# Patient Record
Sex: FEMALE | Race: OTHER | NOT HISPANIC OR LATINO | URBAN - METROPOLITAN AREA
[De-identification: names, ages, dates, MRNs, and addresses within clinical notes are randomized per-mention and may not be internally consistent; named-entity substitution may affect disease eponyms.]

---

## 2017-10-31 ENCOUNTER — INPATIENT (INPATIENT)
Facility: HOSPITAL | Age: 82
LOS: 7 days | Discharge: HOME | DRG: 291 | End: 2017-11-08
Attending: HOSPITALIST | Admitting: HOSPITALIST
Payer: MEDICARE

## 2017-10-31 VITALS
OXYGEN SATURATION: 100 % | RESPIRATION RATE: 21 BRPM | HEART RATE: 56 BPM | DIASTOLIC BLOOD PRESSURE: 90 MMHG | SYSTOLIC BLOOD PRESSURE: 188 MMHG

## 2017-10-31 DIAGNOSIS — Z90.710 ACQUIRED ABSENCE OF BOTH CERVIX AND UTERUS: Chronic | ICD-10-CM

## 2017-10-31 DIAGNOSIS — Z98.890 OTHER SPECIFIED POSTPROCEDURAL STATES: Chronic | ICD-10-CM

## 2017-10-31 DIAGNOSIS — R06.00 DYSPNEA, UNSPECIFIED: ICD-10-CM

## 2017-10-31 LAB
ALBUMIN SERPL ELPH-MCNC: 3.9 G/DL — SIGNIFICANT CHANGE UP (ref 3.3–5)
ALP SERPL-CCNC: 80 U/L — SIGNIFICANT CHANGE UP (ref 40–120)
ALT FLD-CCNC: 20 U/L RC — SIGNIFICANT CHANGE UP (ref 10–45)
ANION GAP SERPL CALC-SCNC: 14 MMOL/L — SIGNIFICANT CHANGE UP (ref 5–17)
APPEARANCE UR: CLEAR — SIGNIFICANT CHANGE UP
APTT BLD: 41.8 SEC — HIGH (ref 27.5–37.4)
AST SERPL-CCNC: 31 U/L — SIGNIFICANT CHANGE UP (ref 10–40)
BASOPHILS # BLD AUTO: 0 K/UL — SIGNIFICANT CHANGE UP (ref 0–0.2)
BASOPHILS NFR BLD AUTO: 0.5 % — SIGNIFICANT CHANGE UP (ref 0–2)
BILIRUB SERPL-MCNC: 0.2 MG/DL — SIGNIFICANT CHANGE UP (ref 0.2–1.2)
BILIRUB UR-MCNC: NEGATIVE — SIGNIFICANT CHANGE UP
BUN SERPL-MCNC: 36 MG/DL — HIGH (ref 7–23)
CALCIUM SERPL-MCNC: 9 MG/DL — SIGNIFICANT CHANGE UP (ref 8.4–10.5)
CHLORIDE SERPL-SCNC: 98 MMOL/L — SIGNIFICANT CHANGE UP (ref 96–108)
CK MB BLD-MCNC: 5.4 % — HIGH (ref 0–3.5)
CK MB CFR SERPL CALC: 5.8 NG/ML — HIGH (ref 0–3.8)
CK SERPL-CCNC: 107 U/L — SIGNIFICANT CHANGE UP (ref 25–170)
CO2 SERPL-SCNC: 19 MMOL/L — LOW (ref 22–31)
COLOR SPEC: YELLOW — SIGNIFICANT CHANGE UP
CREAT SERPL-MCNC: 2.5 MG/DL — HIGH (ref 0.5–1.3)
DIFF PNL FLD: ABNORMAL
EOSINOPHIL # BLD AUTO: 0.2 K/UL — SIGNIFICANT CHANGE UP (ref 0–0.5)
EOSINOPHIL NFR BLD AUTO: 2 % — SIGNIFICANT CHANGE UP (ref 0–6)
EPI CELLS # UR: SIGNIFICANT CHANGE UP /HPF
GAS PNL BLDV: SIGNIFICANT CHANGE UP
GLUCOSE SERPL-MCNC: 121 MG/DL — HIGH (ref 70–99)
GLUCOSE UR QL: NEGATIVE — SIGNIFICANT CHANGE UP
HCT VFR BLD CALC: 35.1 % — SIGNIFICANT CHANGE UP (ref 34.5–45)
HGB BLD-MCNC: 11.5 G/DL — SIGNIFICANT CHANGE UP (ref 11.5–15.5)
INR BLD: 2.45 RATIO — HIGH (ref 0.88–1.16)
KETONES UR-MCNC: NEGATIVE — SIGNIFICANT CHANGE UP
LEUKOCYTE ESTERASE UR-ACNC: NEGATIVE — SIGNIFICANT CHANGE UP
LIDOCAIN IGE QN: 38 U/L — SIGNIFICANT CHANGE UP (ref 7–60)
LYMPHOCYTES # BLD AUTO: 0.9 K/UL — LOW (ref 1–3.3)
LYMPHOCYTES # BLD AUTO: 9.1 % — LOW (ref 13–44)
MCHC RBC-ENTMCNC: 31 PG — SIGNIFICANT CHANGE UP (ref 27–34)
MCHC RBC-ENTMCNC: 32.8 GM/DL — SIGNIFICANT CHANGE UP (ref 32–36)
MCV RBC AUTO: 94.5 FL — SIGNIFICANT CHANGE UP (ref 80–100)
MONOCYTES # BLD AUTO: 0.3 K/UL — SIGNIFICANT CHANGE UP (ref 0–0.9)
MONOCYTES NFR BLD AUTO: 2.9 % — SIGNIFICANT CHANGE UP (ref 2–14)
NEUTROPHILS # BLD AUTO: 8.3 K/UL — HIGH (ref 1.8–7.4)
NEUTROPHILS NFR BLD AUTO: 85.5 % — HIGH (ref 43–77)
NITRITE UR-MCNC: NEGATIVE — SIGNIFICANT CHANGE UP
PH UR: 6 — SIGNIFICANT CHANGE UP (ref 5–8)
PLATELET # BLD AUTO: 311 K/UL — SIGNIFICANT CHANGE UP (ref 150–400)
POTASSIUM SERPL-MCNC: 6.3 MMOL/L — CRITICAL HIGH (ref 3.5–5.3)
POTASSIUM SERPL-SCNC: 6.3 MMOL/L — CRITICAL HIGH (ref 3.5–5.3)
PROT SERPL-MCNC: 8.1 G/DL — SIGNIFICANT CHANGE UP (ref 6–8.3)
PROT UR-MCNC: 150 MG/DL
PROTHROM AB SERPL-ACNC: 27 SEC — HIGH (ref 9.8–12.7)
RAPID RVP RESULT: SIGNIFICANT CHANGE UP
RBC # BLD: 3.72 M/UL — LOW (ref 3.8–5.2)
RBC # FLD: 12.2 % — SIGNIFICANT CHANGE UP (ref 10.3–14.5)
RBC CASTS # UR COMP ASSIST: ABNORMAL /HPF (ref 0–2)
SODIUM SERPL-SCNC: 131 MMOL/L — LOW (ref 135–145)
SP GR SPEC: 1.01 — SIGNIFICANT CHANGE UP (ref 1.01–1.02)
TROPONIN T SERPL-MCNC: 0.12 NG/ML — HIGH (ref 0–0.06)
UROBILINOGEN FLD QL: NEGATIVE — SIGNIFICANT CHANGE UP
WBC # BLD: 9.7 K/UL — SIGNIFICANT CHANGE UP (ref 3.8–10.5)
WBC # FLD AUTO: 9.7 K/UL — SIGNIFICANT CHANGE UP (ref 3.8–10.5)
WBC UR QL: SIGNIFICANT CHANGE UP /HPF (ref 0–5)

## 2017-10-31 PROCEDURE — 71010: CPT | Mod: 26

## 2017-10-31 PROCEDURE — 93010 ELECTROCARDIOGRAM REPORT: CPT

## 2017-10-31 PROCEDURE — 99285 EMERGENCY DEPT VISIT HI MDM: CPT | Mod: 25,GC

## 2017-10-31 RX ORDER — INSULIN HUMAN 100 [IU]/ML
10 INJECTION, SOLUTION SUBCUTANEOUS ONCE
Qty: 0 | Refills: 0 | Status: COMPLETED | OUTPATIENT
Start: 2017-10-31 | End: 2017-10-31

## 2017-10-31 RX ORDER — CALCIUM GLUCONATE 100 MG/ML
2 VIAL (ML) INTRAVENOUS ONCE
Qty: 0 | Refills: 0 | Status: COMPLETED | OUTPATIENT
Start: 2017-10-31 | End: 2017-10-31

## 2017-10-31 RX ORDER — ALBUTEROL 90 UG/1
7.5 AEROSOL, METERED ORAL ONCE
Qty: 0 | Refills: 0 | Status: COMPLETED | OUTPATIENT
Start: 2017-10-31 | End: 2017-10-31

## 2017-10-31 RX ORDER — SODIUM BICARBONATE 1 MEQ/ML
50 SYRINGE (ML) INTRAVENOUS ONCE
Qty: 0 | Refills: 0 | Status: COMPLETED | OUTPATIENT
Start: 2017-10-31 | End: 2017-10-31

## 2017-10-31 RX ORDER — NITROGLYCERIN 6.5 MG
0.4 CAPSULE, EXTENDED RELEASE ORAL ONCE
Qty: 0 | Refills: 0 | Status: COMPLETED | OUTPATIENT
Start: 2017-10-31 | End: 2017-10-31

## 2017-10-31 RX ORDER — IPRATROPIUM/ALBUTEROL SULFATE 18-103MCG
3 AEROSOL WITH ADAPTER (GRAM) INHALATION ONCE
Qty: 0 | Refills: 0 | Status: COMPLETED | OUTPATIENT
Start: 2017-10-31 | End: 2017-10-31

## 2017-10-31 RX ORDER — FUROSEMIDE 40 MG
80 TABLET ORAL ONCE
Qty: 0 | Refills: 0 | Status: COMPLETED | OUTPATIENT
Start: 2017-10-31 | End: 2017-10-31

## 2017-10-31 RX ORDER — DEXTROSE 50 % IN WATER 50 %
50 SYRINGE (ML) INTRAVENOUS ONCE
Qty: 0 | Refills: 0 | Status: COMPLETED | OUTPATIENT
Start: 2017-10-31 | End: 2017-10-31

## 2017-10-31 RX ORDER — SODIUM POLYSTYRENE SULFONATE 4.1 MEQ/G
30 POWDER, FOR SUSPENSION ORAL ONCE
Qty: 0 | Refills: 0 | Status: DISCONTINUED | OUTPATIENT
Start: 2017-10-31 | End: 2017-10-31

## 2017-10-31 RX ADMIN — Medication 400 GRAM(S): at 23:06

## 2017-10-31 RX ADMIN — Medication 50 MILLIEQUIVALENT(S): at 21:57

## 2017-10-31 RX ADMIN — Medication 0.4 MILLIGRAM(S): at 22:58

## 2017-10-31 RX ADMIN — INSULIN HUMAN 10 UNIT(S): 100 INJECTION, SOLUTION SUBCUTANEOUS at 21:55

## 2017-10-31 RX ADMIN — Medication 3 MILLILITER(S): at 19:49

## 2017-10-31 RX ADMIN — Medication 80 MILLIGRAM(S): at 23:10

## 2017-10-31 RX ADMIN — Medication 50 MILLILITER(S): at 21:56

## 2017-10-31 RX ADMIN — Medication 125 MILLIGRAM(S): at 21:04

## 2017-10-31 RX ADMIN — ALBUTEROL 7.5 MILLIGRAM(S): 90 AEROSOL, METERED ORAL at 22:28

## 2017-10-31 NOTE — ED PROVIDER NOTE - OBJECTIVE STATEMENT
85F pmh asthma, DM, HTN, afib found yesterday, hypothyroid p/w shortness of breath and nausea/vomiting.  Pt has had URI symptoms and shortness of breath for past few days, but today patient felt nauseas and vomited.  EMS brought patient in and gave her zofran en route.  Pt denies chest pain, abd pain, diarreha, fever/chills.   - Kimberli Matthew, DO 85F pmh asthma, DM, HTN, afib found yesterday, hypothyroid p/w shortness of breath and nausea/vomiting.  Pt has had URI symptoms and shortness of breath for past few days, but today patient felt nauseas and vomited.  EMS brought patient in and gave her zofran en route.  Pt denies chest pain, abd pain, diarreha, fever/chills.   PMD: Dr. Abhishek Matthew DO

## 2017-10-31 NOTE — ED ADULT NURSE REASSESSMENT NOTE - GENERAL PATIENT STATE
comfortable appearance/cooperative/family/SO at bedside
family/SO at bedside/comfortable appearance/cooperative

## 2017-10-31 NOTE — ED ADULT NURSE NOTE - OBJECTIVE STATEMENT
pt BIBA from home with c/o "SOB and cough that started yesterday but today it did not get any better and I became more nauseous and started vomiting. Yesterday I was dx with afib and was started on blood thinners." pt denies fevers/chills, chest pain, or difficulty breathing at present. pt had x1 episode of vomiting while on the ambulance and received 4mg of zofran PTA. pt speaking in full complete sentences at present.

## 2017-10-31 NOTE — ED ADULT TRIAGE NOTE - CHIEF COMPLAINT QUOTE
pt BIBA with c/o sob since yesterday and n/v today pt BIBA with c/o sob since yesterday and n/v today. found to be in new onset afib at PMDs office yesterday as per daughter

## 2017-10-31 NOTE — ED PROVIDER NOTE - ATTENDING CONTRIBUTION TO CARE
85F pmh asthma, DM, HTN, afib found yesterday, hypothyroid p/w shortness of breath and nausea/vomiting.  No CP. ECG NSR.  Possible asthma exacerbation vs cardiac etiology.  Will obtain ekg, cxr, labs, cultures, rvp, give duonebs and plan to admit.

## 2017-10-31 NOTE — ED ADULT NURSE NOTE - PMH
Diabetes    HTN (hypertension)    Hypothyroid Asthma    Diabetes    HTN (hypertension)    Hypothyroid

## 2017-10-31 NOTE — ED PROVIDER NOTE - PHYSICAL EXAMINATION
Gen: AOx3, in moderate respiratory distress  Head: NCAT  HEENT: PERRL, oral mucosa moist, normal conjunctiva  Lung: Wheezing bilaterally and decreased breath sounds bilateral bases.   CV: rrr, no murmurs, Normal perfusion  Abd: soft, NTND, no CVA tenderness  MSK: No edema, no visible deformities  Neuro: No focal neurologic deficits  Skin: No rash   Psych: normal affect   - Kimberli Matthew, DO

## 2017-10-31 NOTE — ED PROVIDER NOTE - MEDICAL DECISION MAKING DETAILS
85F pmh [place], new onset afib p/w nausea, shortness of breath.  Wheezing and poor air entry on exam.  Possible asthma exacerbation vs cardiac etiology.  Will obtain ekg, cxr, labs, cultures, rvp, dive duonebs and plan to admit. - Kimberli Matthew DO 85F pmh [place], new onset afib p/w nausea, shortness of breath.  Wheezing and poor air entry on exam.  Possible asthma exacerbation vs cardiac etiology.  Will obtain ekg, cxr, labs, cultures, rvp, give duonebs and plan to admit. - Kimberli Matthew DO

## 2017-11-01 ENCOUNTER — OUTPATIENT (OUTPATIENT)
Dept: OUTPATIENT SERVICES | Facility: HOSPITAL | Age: 82
LOS: 1 days | End: 2017-11-01
Payer: MEDICARE

## 2017-11-01 DIAGNOSIS — J45.909 UNSPECIFIED ASTHMA, UNCOMPLICATED: ICD-10-CM

## 2017-11-01 DIAGNOSIS — Z98.890 OTHER SPECIFIED POSTPROCEDURAL STATES: Chronic | ICD-10-CM

## 2017-11-01 DIAGNOSIS — J96.01 ACUTE RESPIRATORY FAILURE WITH HYPOXIA: ICD-10-CM

## 2017-11-01 DIAGNOSIS — I50.21 ACUTE SYSTOLIC (CONGESTIVE) HEART FAILURE: ICD-10-CM

## 2017-11-01 DIAGNOSIS — I48.91 UNSPECIFIED ATRIAL FIBRILLATION: ICD-10-CM

## 2017-11-01 DIAGNOSIS — E11.9 TYPE 2 DIABETES MELLITUS WITHOUT COMPLICATIONS: ICD-10-CM

## 2017-11-01 DIAGNOSIS — E87.70 FLUID OVERLOAD, UNSPECIFIED: ICD-10-CM

## 2017-11-01 DIAGNOSIS — Z29.9 ENCOUNTER FOR PROPHYLACTIC MEASURES, UNSPECIFIED: ICD-10-CM

## 2017-11-01 DIAGNOSIS — R06.00 DYSPNEA, UNSPECIFIED: ICD-10-CM

## 2017-11-01 DIAGNOSIS — I16.1 HYPERTENSIVE EMERGENCY: ICD-10-CM

## 2017-11-01 DIAGNOSIS — E87.5 HYPERKALEMIA: ICD-10-CM

## 2017-11-01 DIAGNOSIS — N17.9 ACUTE KIDNEY FAILURE, UNSPECIFIED: ICD-10-CM

## 2017-11-01 DIAGNOSIS — Z90.710 ACQUIRED ABSENCE OF BOTH CERVIX AND UTERUS: Chronic | ICD-10-CM

## 2017-11-01 DIAGNOSIS — E03.9 HYPOTHYROIDISM, UNSPECIFIED: ICD-10-CM

## 2017-11-01 LAB
BASE EXCESS BLDV CALC-SCNC: -2.4 MMOL/L — LOW (ref -2–2)
BASE EXCESS BLDV CALC-SCNC: -3 MMOL/L — LOW (ref -2–2)
CA-I SERPL-SCNC: 1.18 MMOL/L — SIGNIFICANT CHANGE UP (ref 1.12–1.3)
CA-I SERPL-SCNC: 1.3 MMOL/L — SIGNIFICANT CHANGE UP (ref 1.12–1.3)
CHLORIDE BLDV-SCNC: 95 MMOL/L — LOW (ref 96–108)
CHLORIDE BLDV-SCNC: 98 MMOL/L — SIGNIFICANT CHANGE UP (ref 96–108)
CO2 BLDV-SCNC: 24 MMOL/L — SIGNIFICANT CHANGE UP (ref 22–30)
CO2 BLDV-SCNC: 25 MMOL/L — SIGNIFICANT CHANGE UP (ref 22–30)
CREAT ?TM UR-MCNC: 15 MG/DL — SIGNIFICANT CHANGE UP
CREAT ?TM UR-MCNC: 16 MG/DL — SIGNIFICANT CHANGE UP
CULTURE RESULTS: NO GROWTH — SIGNIFICANT CHANGE UP
GAS PNL BLDV: 126 MMOL/L — LOW (ref 136–145)
GAS PNL BLDV: 129 MMOL/L — LOW (ref 136–145)
GAS PNL BLDV: SIGNIFICANT CHANGE UP
GLUCOSE BLDC GLUCOMTR-MCNC: 241 MG/DL — HIGH (ref 70–99)
GLUCOSE BLDC GLUCOMTR-MCNC: 263 MG/DL — HIGH (ref 70–99)
GLUCOSE BLDC GLUCOMTR-MCNC: 273 MG/DL — HIGH (ref 70–99)
GLUCOSE BLDV-MCNC: 241 MG/DL — HIGH (ref 70–99)
GLUCOSE BLDV-MCNC: 249 MG/DL — HIGH (ref 70–99)
HCO3 BLDV-SCNC: 23 MMOL/L — SIGNIFICANT CHANGE UP (ref 21–29)
HCO3 BLDV-SCNC: 23 MMOL/L — SIGNIFICANT CHANGE UP (ref 21–29)
HCT VFR BLDA CALC: 34 % — LOW (ref 39–50)
HCT VFR BLDA CALC: 35 % — LOW (ref 39–50)
HGB BLD CALC-MCNC: 11.1 G/DL — LOW (ref 11.5–15.5)
HGB BLD CALC-MCNC: 11.5 G/DL — SIGNIFICANT CHANGE UP (ref 11.5–15.5)
LACTATE BLDV-MCNC: 1.2 MMOL/L — SIGNIFICANT CHANGE UP (ref 0.7–2)
LACTATE BLDV-MCNC: 3.4 MMOL/L — HIGH (ref 0.7–2)
OSMOLALITY SERPL: 292 MOS/KG — SIGNIFICANT CHANGE UP (ref 275–300)
OSMOLALITY UR: 285 MOS/KG — LOW (ref 300–900)
OTHER CELLS CSF MANUAL: 10 ML/DL — LOW (ref 18–22)
PCO2 BLDV: 47 MMHG — SIGNIFICANT CHANGE UP (ref 35–50)
PCO2 BLDV: 48 MMHG — SIGNIFICANT CHANGE UP (ref 35–50)
PH BLDV: 7.31 — LOW (ref 7.35–7.45)
PH BLDV: 7.31 — LOW (ref 7.35–7.45)
PO2 BLDV: 37 MMHG — SIGNIFICANT CHANGE UP (ref 25–45)
PO2 BLDV: 40 MMHG — SIGNIFICANT CHANGE UP (ref 25–45)
POTASSIUM BLDV-SCNC: 5 MMOL/L — SIGNIFICANT CHANGE UP (ref 3.5–5)
POTASSIUM BLDV-SCNC: 5.1 MMOL/L — HIGH (ref 3.5–5)
PROT ?TM UR-MCNC: 67 MG/DL — HIGH (ref 0–12)
PROT/CREAT UR-RTO: 4.2 RATIO — HIGH (ref 0–0.2)
SAO2 % BLDV: 67 % — SIGNIFICANT CHANGE UP (ref 67–88)
SAO2 % BLDV: 68 % — SIGNIFICANT CHANGE UP (ref 67–88)
SODIUM UR-SCNC: 86 MMOL/L — SIGNIFICANT CHANGE UP
SPECIMEN SOURCE: SIGNIFICANT CHANGE UP
TROPONIN T SERPL-MCNC: 0.1 NG/ML — HIGH (ref 0–0.06)

## 2017-11-01 PROCEDURE — 99223 1ST HOSP IP/OBS HIGH 75: CPT | Mod: AI

## 2017-11-01 PROCEDURE — 93308 TTE F-UP OR LMTD: CPT | Mod: 26

## 2017-11-01 PROCEDURE — 12345: CPT | Mod: GC,NC

## 2017-11-01 PROCEDURE — 99223 1ST HOSP IP/OBS HIGH 75: CPT | Mod: GC

## 2017-11-01 PROCEDURE — G9001: CPT

## 2017-11-01 PROCEDURE — 99233 SBSQ HOSP IP/OBS HIGH 50: CPT

## 2017-11-01 RX ORDER — DEXTROSE 50 % IN WATER 50 %
25 SYRINGE (ML) INTRAVENOUS ONCE
Qty: 0 | Refills: 0 | Status: DISCONTINUED | OUTPATIENT
Start: 2017-11-01 | End: 2017-11-01

## 2017-11-01 RX ORDER — ALBUTEROL 90 UG/1
108 AEROSOL, METERED ORAL
Qty: 0 | Refills: 0 | COMMUNITY

## 2017-11-01 RX ORDER — DEXTROSE 50 % IN WATER 50 %
12.5 SYRINGE (ML) INTRAVENOUS ONCE
Qty: 0 | Refills: 0 | Status: DISCONTINUED | OUTPATIENT
Start: 2017-11-01 | End: 2017-11-01

## 2017-11-01 RX ORDER — INSULIN LISPRO 100/ML
VIAL (ML) SUBCUTANEOUS
Qty: 0 | Refills: 0 | Status: DISCONTINUED | OUTPATIENT
Start: 2017-11-01 | End: 2017-11-08

## 2017-11-01 RX ORDER — HEPARIN SODIUM 5000 [USP'U]/ML
3000 INJECTION INTRAVENOUS; SUBCUTANEOUS EVERY 6 HOURS
Qty: 0 | Refills: 0 | Status: DISCONTINUED | OUTPATIENT
Start: 2017-11-01 | End: 2017-11-07

## 2017-11-01 RX ORDER — GLUCAGON INJECTION, SOLUTION 0.5 MG/.1ML
1 INJECTION, SOLUTION SUBCUTANEOUS ONCE
Qty: 0 | Refills: 0 | Status: DISCONTINUED | OUTPATIENT
Start: 2017-11-01 | End: 2017-11-08

## 2017-11-01 RX ORDER — FUROSEMIDE 40 MG
60 TABLET ORAL
Qty: 0 | Refills: 0 | Status: DISCONTINUED | OUTPATIENT
Start: 2017-11-01 | End: 2017-11-01

## 2017-11-01 RX ORDER — SODIUM CHLORIDE 9 MG/ML
1000 INJECTION, SOLUTION INTRAVENOUS
Qty: 0 | Refills: 0 | Status: DISCONTINUED | OUTPATIENT
Start: 2017-11-01 | End: 2017-11-08

## 2017-11-01 RX ORDER — BUDESONIDE AND FORMOTEROL FUMARATE DIHYDRATE 160; 4.5 UG/1; UG/1
2 AEROSOL RESPIRATORY (INHALATION)
Qty: 0 | Refills: 0 | Status: DISCONTINUED | OUTPATIENT
Start: 2017-11-01 | End: 2017-11-01

## 2017-11-01 RX ORDER — ACETAMINOPHEN 500 MG
650 TABLET ORAL EVERY 6 HOURS
Qty: 0 | Refills: 0 | Status: DISCONTINUED | OUTPATIENT
Start: 2017-11-01 | End: 2017-11-08

## 2017-11-01 RX ORDER — AMLODIPINE BESYLATE 2.5 MG/1
1 TABLET ORAL
Qty: 0 | Refills: 0 | COMMUNITY

## 2017-11-01 RX ORDER — AMLODIPINE BESYLATE 2.5 MG/1
10 TABLET ORAL DAILY
Qty: 0 | Refills: 0 | Status: DISCONTINUED | OUTPATIENT
Start: 2017-11-01 | End: 2017-11-01

## 2017-11-01 RX ORDER — FUROSEMIDE 40 MG
40 TABLET ORAL
Qty: 0 | Refills: 0 | Status: DISCONTINUED | OUTPATIENT
Start: 2017-11-01 | End: 2017-11-01

## 2017-11-01 RX ORDER — INSULIN LISPRO 100/ML
VIAL (ML) SUBCUTANEOUS AT BEDTIME
Qty: 0 | Refills: 0 | Status: DISCONTINUED | OUTPATIENT
Start: 2017-11-01 | End: 2017-11-01

## 2017-11-01 RX ORDER — BUDESONIDE, MICRONIZED 100 %
0.5 POWDER (GRAM) MISCELLANEOUS
Qty: 0 | Refills: 0 | Status: DISCONTINUED | OUTPATIENT
Start: 2017-11-01 | End: 2017-11-08

## 2017-11-01 RX ORDER — GLUCAGON INJECTION, SOLUTION 0.5 MG/.1ML
1 INJECTION, SOLUTION SUBCUTANEOUS ONCE
Qty: 0 | Refills: 0 | Status: DISCONTINUED | OUTPATIENT
Start: 2017-11-01 | End: 2017-11-01

## 2017-11-01 RX ORDER — HEPARIN SODIUM 5000 [USP'U]/ML
6000 INJECTION INTRAVENOUS; SUBCUTANEOUS EVERY 6 HOURS
Qty: 0 | Refills: 0 | Status: DISCONTINUED | OUTPATIENT
Start: 2017-11-01 | End: 2017-11-07

## 2017-11-01 RX ORDER — METOPROLOL TARTRATE 50 MG
50 TABLET ORAL
Qty: 0 | Refills: 0 | Status: DISCONTINUED | OUTPATIENT
Start: 2017-11-01 | End: 2017-11-01

## 2017-11-01 RX ORDER — AMLODIPINE BESYLATE 2.5 MG/1
10 TABLET ORAL DAILY
Qty: 0 | Refills: 0 | Status: DISCONTINUED | OUTPATIENT
Start: 2017-11-01 | End: 2017-11-08

## 2017-11-01 RX ORDER — IPRATROPIUM/ALBUTEROL SULFATE 18-103MCG
3 AEROSOL WITH ADAPTER (GRAM) INHALATION EVERY 6 HOURS
Qty: 0 | Refills: 0 | Status: DISCONTINUED | OUTPATIENT
Start: 2017-11-01 | End: 2017-11-02

## 2017-11-01 RX ORDER — LEVOTHYROXINE SODIUM 125 MCG
1 TABLET ORAL
Qty: 0 | Refills: 0 | COMMUNITY

## 2017-11-01 RX ORDER — GLIMEPIRIDE 1 MG
1 TABLET ORAL
Qty: 0 | Refills: 0 | COMMUNITY

## 2017-11-01 RX ORDER — LEVOTHYROXINE SODIUM 125 MCG
75 TABLET ORAL DAILY
Qty: 0 | Refills: 0 | Status: DISCONTINUED | OUTPATIENT
Start: 2017-11-01 | End: 2017-11-08

## 2017-11-01 RX ORDER — SODIUM CHLORIDE 9 MG/ML
1000 INJECTION, SOLUTION INTRAVENOUS
Qty: 0 | Refills: 0 | Status: DISCONTINUED | OUTPATIENT
Start: 2017-11-01 | End: 2017-11-01

## 2017-11-01 RX ORDER — HYDRALAZINE HCL 50 MG
5 TABLET ORAL ONCE
Qty: 0 | Refills: 0 | Status: COMPLETED | OUTPATIENT
Start: 2017-11-01 | End: 2017-11-01

## 2017-11-01 RX ORDER — INSULIN LISPRO 100/ML
VIAL (ML) SUBCUTANEOUS
Qty: 0 | Refills: 0 | Status: DISCONTINUED | OUTPATIENT
Start: 2017-11-01 | End: 2017-11-01

## 2017-11-01 RX ORDER — FLUTICASONE PROPIONATE AND SALMETEROL 50; 250 UG/1; UG/1
1 POWDER ORAL; RESPIRATORY (INHALATION)
Qty: 0 | Refills: 0 | COMMUNITY

## 2017-11-01 RX ORDER — HEPARIN SODIUM 5000 [USP'U]/ML
INJECTION INTRAVENOUS; SUBCUTANEOUS
Qty: 25000 | Refills: 0 | Status: DISCONTINUED | OUTPATIENT
Start: 2017-11-01 | End: 2017-11-07

## 2017-11-01 RX ORDER — DEXTROSE 50 % IN WATER 50 %
1 SYRINGE (ML) INTRAVENOUS ONCE
Qty: 0 | Refills: 0 | Status: DISCONTINUED | OUTPATIENT
Start: 2017-11-01 | End: 2017-11-01

## 2017-11-01 RX ORDER — INSULIN GLARGINE 100 [IU]/ML
7 INJECTION, SOLUTION SUBCUTANEOUS AT BEDTIME
Qty: 0 | Refills: 0 | Status: DISCONTINUED | OUTPATIENT
Start: 2017-11-01 | End: 2017-11-08

## 2017-11-01 RX ORDER — RIVAROXABAN 15 MG-20MG
15 KIT ORAL EVERY 24 HOURS
Qty: 0 | Refills: 0 | Status: DISCONTINUED | OUTPATIENT
Start: 2017-11-01 | End: 2017-11-01

## 2017-11-01 RX ORDER — LISINOPRIL 2.5 MG/1
1 TABLET ORAL
Qty: 0 | Refills: 0 | COMMUNITY

## 2017-11-01 RX ORDER — DEXTROSE 50 % IN WATER 50 %
25 SYRINGE (ML) INTRAVENOUS ONCE
Qty: 0 | Refills: 0 | Status: DISCONTINUED | OUTPATIENT
Start: 2017-11-01 | End: 2017-11-08

## 2017-11-01 RX ORDER — DEXTROSE 50 % IN WATER 50 %
12.5 SYRINGE (ML) INTRAVENOUS ONCE
Qty: 0 | Refills: 0 | Status: DISCONTINUED | OUTPATIENT
Start: 2017-11-01 | End: 2017-11-08

## 2017-11-01 RX ORDER — INSULIN LISPRO 100/ML
VIAL (ML) SUBCUTANEOUS EVERY 6 HOURS
Qty: 0 | Refills: 0 | Status: DISCONTINUED | OUTPATIENT
Start: 2017-11-01 | End: 2017-11-01

## 2017-11-01 RX ORDER — DEXTROSE 50 % IN WATER 50 %
1 SYRINGE (ML) INTRAVENOUS ONCE
Qty: 0 | Refills: 0 | Status: DISCONTINUED | OUTPATIENT
Start: 2017-11-01 | End: 2017-11-08

## 2017-11-01 RX ORDER — AMIODARONE HYDROCHLORIDE 400 MG/1
400 TABLET ORAL
Qty: 0 | Refills: 0 | Status: DISCONTINUED | OUTPATIENT
Start: 2017-11-01 | End: 2017-11-01

## 2017-11-01 RX ORDER — HYDRALAZINE HCL 50 MG
10 TABLET ORAL ONCE
Qty: 0 | Refills: 0 | Status: COMPLETED | OUTPATIENT
Start: 2017-11-01 | End: 2017-11-01

## 2017-11-01 RX ORDER — INSULIN LISPRO 100/ML
VIAL (ML) SUBCUTANEOUS AT BEDTIME
Qty: 0 | Refills: 0 | Status: DISCONTINUED | OUTPATIENT
Start: 2017-11-01 | End: 2017-11-08

## 2017-11-01 RX ADMIN — Medication 1: at 18:09

## 2017-11-01 RX ADMIN — Medication 30 MILLIGRAM(S): at 22:05

## 2017-11-01 RX ADMIN — Medication 650 MILLIGRAM(S): at 16:43

## 2017-11-01 RX ADMIN — AMIODARONE HYDROCHLORIDE 400 MILLIGRAM(S): 400 TABLET ORAL at 12:52

## 2017-11-01 RX ADMIN — Medication 650 MILLIGRAM(S): at 17:13

## 2017-11-01 RX ADMIN — Medication 10 MILLIGRAM(S): at 04:22

## 2017-11-01 RX ADMIN — Medication 60 MILLIGRAM(S): at 05:30

## 2017-11-01 RX ADMIN — Medication 5 MILLIGRAM(S): at 03:27

## 2017-11-01 RX ADMIN — Medication 0.5 MILLIGRAM(S): at 18:06

## 2017-11-01 RX ADMIN — Medication: at 10:44

## 2017-11-01 RX ADMIN — AMLODIPINE BESYLATE 10 MILLIGRAM(S): 2.5 TABLET ORAL at 07:34

## 2017-11-01 RX ADMIN — Medication 3 MILLILITER(S): at 13:11

## 2017-11-01 RX ADMIN — Medication 1: at 22:06

## 2017-11-01 RX ADMIN — BUDESONIDE AND FORMOTEROL FUMARATE DIHYDRATE 2 PUFF(S): 160; 4.5 AEROSOL RESPIRATORY (INHALATION) at 05:58

## 2017-11-01 RX ADMIN — Medication 30 MILLIGRAM(S): at 13:11

## 2017-11-01 RX ADMIN — Medication 50 MILLIGRAM(S): at 12:53

## 2017-11-01 RX ADMIN — Medication 75 MICROGRAM(S): at 05:30

## 2017-11-01 RX ADMIN — HEPARIN SODIUM 1300 UNIT(S)/HR: 5000 INJECTION INTRAVENOUS; SUBCUTANEOUS at 19:01

## 2017-11-01 RX ADMIN — Medication 3 MILLILITER(S): at 05:30

## 2017-11-01 RX ADMIN — INSULIN GLARGINE 7 UNIT(S): 100 INJECTION, SOLUTION SUBCUTANEOUS at 22:06

## 2017-11-01 RX ADMIN — Medication 2: at 14:06

## 2017-11-01 RX ADMIN — Medication 3 MILLILITER(S): at 18:13

## 2017-11-01 NOTE — CONSULT NOTE ADULT - ASSESSMENT
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    -- check TTE  -- ischemia evaluation  -- continue tele  -- please obtain PMD/Cardiologist records documenting AFib  -- hold amiodarone  -- can consider discontinuing heparin gtt until AFib is documented    Hunter Blandon MD

## 2017-11-01 NOTE — CONSULT NOTE ADULT - ATTENDING COMMENTS
seen and examined with fellow. I agree with H & P, A & P.  Hold amio and Xarelto.  Obtain ecg with "AF".  Check echo.
84 yo [place] female with Dm, HTN, likely underlying CKD now with SAM, hyperkalemia and decompensated CHF and cardiorenal syndrome  SAM: trend Cr and renal sono  Pt reportedly had history of ureteral stent in [place]  Hyperkalemia improved with diuresis  Continue lasix IV  CHF: echo and evaluate LV dysfunction and valvular disease  Cardio followup
Please page [phone] with questions or call the office [phone].

## 2017-11-01 NOTE — H&P ADULT - PROBLEM SELECTOR PLAN 1
- Patient placed on bipap 10/5, 35% with 100% SaO2 and improving VBG (ph 7.19->7.31).  - Will continue to monitor breathing status. Titrate off bipap as tolerated.  - S/p diuresis 80mg IV lasix. Further diuresis as needed in the context of possible heart failure. - Patient placed on bipap 10/5, 35% with 100% SaO2 and improving VBG (ph 7.19->7.31). LIkely etiology is flash pulmonary edema in setting of hypertensive emergency  - Will continue to monitor breathing status. Titrate off bipap as tolerated.  - S/p diuresis 80mg IV lasix. Pt had brisk UOP ~2L afterwards, so will c/w reduced 40 IV bid for now  -TTE

## 2017-11-01 NOTE — H&P ADULT - NSHPLABSRESULTS_GEN_ALL_CORE
CXR: clear lung.    (10-31 @ 20:34)                      11.5  9.7 )-----------( 311                 35.1    Neutrophils = 8.3 (85.5%)  Lymphocytes = 0.9 (9.1%)  Eosinophils = 0.2 (2.0%)  Basophils = 0.0 (0.5%)  Monocytes = 0.3 (2.9%)  Bands = --%        132<L>  |  96  |  36<H>  ----------------------------<  259<H>  5.2   |  22  |  2.47<H>    Ca    9.8      2017 02:21    TPro  8.1  /  Alb  3.9  /  TBili  0.2  /  DBili  x   /  AST  31  /  ALT  20  /  AlkPhos  80  10-31    ( 31 Oct 2017 20:34 )   PT: 27.0 sec;   INR: 2.45 ratio;       PTT:41.8 sec  CARDIAC MARKERS ( 31 Oct 2017 20:34 )  Trop 0.12 ng/mL<H> /  U/L / CKMB x           RVP:(10-31 @ 20:33)  [facility]      Venous Blood Gas:   @ 02:21  7.31/47/37/23/67  VBG Lactate: 1.2  Venous Blood Gas:  10-31 @ 20:34  7.19/57/30/21/40  VBG Lactate: 1.8        Tox:         Urinalysis Basic - ( 31 Oct 2017 21:14 )    Color: Yellow / Appearance: Clear / S.013 / pH: x  Gluc: x / Ketone: Negative  / Bili: Negative / Urobili: Negative   Blood: x / Protein: 150 mg/dL / Nitrite: Negative   Leuk Esterase: Negative / RBC: 10-25 /HPF / WBC 3-5 /HPF   Sq Epi: x / Non Sq Epi: Few /HPF / Bacteria: x  CXR personally reviewed : clear lungs was the prelim read, though there does appear to be vasc congestion.    Labs personally reviewed     (10-31 @ 20:34)                      11.5  9.7 )-----------( 311                 35.1    Neutrophils = 8.3 (85.5%)  Lymphocytes = 0.9 (9.1%)  Eosinophils = 0.2 (2.0%)  Basophils = 0.0 (0.5%)  Monocytes = 0.3 (2.9%)  Bands = --%        132<L>  |  96  |  36<H>  ----------------------------<  259<H>  5.2   |  22  |  2.47<H>    Ca    9.8      2017 02:21    TPro  8.1  /  Alb  3.9  /  TBili  0.2  /  DBili  x   /  AST  31  /  ALT  20  /  AlkPhos  80  10-31    ( 31 Oct 2017 20:34 )   PT: 27.0 sec;   INR: 2.45 ratio;       PTT:41.8 sec  CARDIAC MARKERS ( 31 Oct 2017 20:34 )  Trop 0.12 ng/mL<H> /  U/L / CKMB x           RVP:(10-31 @ 20:33)  [facility]      Venous Blood Gas:   @ 02:21  7.31/47/37/23/67  VBG Lactate: 1.2  Venous Blood Gas:  10-31 @ 20:34  7.19/57/30/21/40  VBG Lactate: 1.8        Tox:         Urinalysis Basic - ( 31 Oct 2017 21:14 )    Color: Yellow / Appearance: Clear / S.013 / pH: x  Gluc: x / Ketone: Negative  / Bili: Negative / Urobili: Negative   Blood: x / Protein: 150 mg/dL / Nitrite: Negative   Leuk Esterase: Negative / RBC: 10-25 /HPF / WBC 3-5 /HPF   Sq Epi: x / Non Sq Epi: Few /HPF / Bacteria: x    EKG personally reviewed trifascicular block, rate in 50s.

## 2017-11-01 NOTE — CONSULT NOTE ADULT - PROBLEM SELECTOR RECOMMENDATION 9
Urinalysis with active sediment, pending workup   -f/u including protein / creatinine ratio  -f/u glomerulonephritis workup.  No history of hemoptysis, and unlikely that dyspnea is due to pulmorenal syndrome.    -Would check renal sonogram to assess kidney size and assess for obstruction  -maintain martin catheter  -monitor creatinine trend  -poor cardiac function noted on Echo, would consider cardiac workup - renal insufficiency possibly due to cardiorenal syndrome

## 2017-11-01 NOTE — H&P ADULT - PROBLEM SELECTOR PLAN 6
- New diagnosis of afib recently. However, only sinus armand with 1st HB on admission.  - Hold home dosage of loppressor given bradycardia for now.  - Continue to monitor on telemetry. - New diagnosis of afib recently. However, only sinus armand with 1st HB on admission.  - Hold home dosage of loppressor given bradycardia for now.  - Continue to monitor on telemetry.  - C/w renally dosing xarelto. - New diagnosis of afib recently reported by family, unsure where or when this was made. However, pt with sinus armand on monitor with evidence of trifascicular block on 12 Lead   Hold home dosage of loppressor and amio given bradycardia for now.  - Continue to monitor on telemetry; clarify history; for now continue with xarelto as if true AF her CHADSVASC would be >2  -Cards eval for trifasc block with rate in 50s; will keep pacer pads at bedside  -no ischemic chest pain or hx of syncope per grandson at bedside; further history to be clarified in am when son returns with meds and possibly charts from [place]  -Cards/EP eval called for eval of trifasc block and possible nonemergent PPM  - C/w renally dosing xarelto.

## 2017-11-01 NOTE — H&P ADULT - ATTENDING COMMENTS
Pt seen and examined at bedside.  I have precepted this case with house staff and agree with resident note above and edited where appropriate.

## 2017-11-01 NOTE — PROGRESS NOTE ADULT - ASSESSMENT
84yo F w/ PMHx of DM, HTN, hypothyroidism, asthma, newly diagnosed afib BIBEMS yesterday for sob and n/v at home, found to have hyperK in the context of SAM, likely flash pulm edema in setting of hypertensive emergency, and resp distress. 84yo F w/ PMHx of DM, HTN, hypothyroidism, asthma, newly diagnosed afib 2 days ago BIBEMS yesterday for sob and n/v at home, found to have a potassium of 6.2 in the context of SAM, likely flash pulm edema in setting of hypertensive emergency vs asthma excacerbation, and hypoxic and hypercapnic respiratory distress

## 2017-11-01 NOTE — H&P ADULT - PROBLEM SELECTOR PLAN 5
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  - Diuresis as needed. Strict I&Os.  - Likely contributing to resp distress.  - Repeat formal echo in the am.

## 2017-11-01 NOTE — PROGRESS NOTE ADULT - PROBLEM SELECTOR PLAN 7
- Resp status stable on bipap. Continue to trend VBG.  - S/p solumedrol 125mg IV push. Continue with advair and Proair at outpatient dosage. Does not appear to be acute exacerbation at this time; no wheezing on exam, no evidence of hypoxia or hypercapnia, and resp distress improved with bipap/BP control. - s/p solumedrol 125mg IV in the ED  - c/w solumedrol 30g q8h  - will start Pulmicort 0.5mg BID s/p 1x solumedrol 125mg IV in the ED  - c/w solumedrol 30g q8h  - will start Pulmicort 0.5mg BID

## 2017-11-01 NOTE — H&P ADULT - PROBLEM SELECTOR PLAN 2
- , MAP>100 at the time of examination. Patient likely did not tolerate po bp meds at home yesterday due to repeated vomiting.  - Hydralazine IV push as needed for now. Continue to monitor BP.  - Home dosage amlodipine restarted. Lisinopril on hold due to hyperK status. - , MAP>100 at the time of examination. Patient likely did not tolerate po bp meds at home yesterday due to repeated vomiting. Of note, patient family reported history of renal artery stenosis requiring stenting.  - Hydralazine IV push as needed for now. Continue to monitor BP.  - Home dosage amlodipine restarted. Lisinopril on hold due to hyperK status.  - Will discuss with nephrology regarding resistant HTN treatment in the am. - , MAP>100 at the time of examination with evidence of respiratory distress/flash edema. Patient likely did not tolerate po bp meds at home yesterday due to repeated vomiting. Of note, patient family reported history of renal artery stenosis requiring stenting.  - Hydralazine IV push as needed for now. Continue to monitor BP; goal pressure in first 24 hours is SBP of ~170s; most recent 174.   - Home dosage amlodipine restarted. Lisinopril on hold due to hyperK status and ?ARIANNA.  If persistently >180 in am will consider PO hydralazine.   - Will discuss with nephrology regarding resistant HTN treatment in the am, for now pt is at goal - , MAP>100 at the time of examination with evidence of respiratory distress/flash edema. Patient likely did not tolerate po bp meds at home yesterday due to repeated vomiting. Of note, patient family reported history of renal artery stenosis requiring stenting.  - Hydralazine IV push as needed for now. Continue to monitor BP; goal pressure in first 24 hours is SBP of ~170s; most recent 174.   - Home dosage amlodipine restarted. Lisinopril on hold due to hyperK status and ?ARIANNA.  If persistently >180 in am will consider PO hydralazine.   - Will defer to am team discuss with nephrology regarding resistant HTN treatment in the am, for now pt is at goal and would not want to drop more than 25% in first day. - , MAP>100 at the time of examination with evidence of respiratory distress/flash edema. Patient likely did not tolerate po bp meds at home yesterday due to repeated vomiting. Of note, patient family reported history of renal artery stenosis requiring stenting.  - s/p hydralazine 10, 5. Continue to monitor BP; goal pressure in first 24 hours is SBP of ~170s; most recent 174.   - Home dosage amlodipine restarted. Lisinopril on hold due to hyperK status and ?ARIANNA.  If persistently >180 in am will consider PO hydralazine.   - Will defer to am team discuss with nephrology regarding resistant HTN treatment in the am, for now pt is at goal and would not want to drop more than 25% in first day.

## 2017-11-01 NOTE — ED PROCEDURE NOTE - PROCEDURE ADDITIONAL DETAILS
POCUS: Emergency Department Focused Ultrasound performed at patient's bedside.  The complete report will be available in PACS.   Poor LV systolic function  No pericardial effusion

## 2017-11-01 NOTE — PROGRESS NOTE ADULT - PROBLEM SELECTOR PLAN 6
- New diagnosis of afib recently reported by family, unsure where or when this was made. However, pt with sinus armand on monitor with evidence of trifascicular block on 12 Lead   Hold home dosage of loppressor and amio given bradycardia for now.  - Continue to monitor on telemetry; clarify history; for now continue with xarelto as if true AF her CHADSVASC would be >2  -Cards eval for trifasc block with rate in 50s; will keep pacer pads at bedside  -no ischemic chest pain or hx of syncope per grandson at bedside; further history to be clarified in am when son returns with meds and possibly charts from [place]  -Cards/EP eval called for eval of trifasc block and possible nonemergent PPM  - C/w renally dosing xarelto. - Patient with newly diagnosed afib at outpatient cardiologist office and started on amiodarone, xarelto, and metoprolol    - will hold xarelto in the setting of SAM  - Cards/EP eval called for eval of trifasc block on admission with sinus bradycardia (1st degree AV block) and possible nonemergent PPM - Patient with newly diagnosed afib at outpatient cardiologist office and started on amiodarone, xarelto, and metoprolol    - will hold xarelto in the setting of SAM  - Cards/EP eval called for eval of trifasc block on admission with sinus bradycardia (1st degree AV block) and possible nonemergent PPM  holding home metoprolol and amiodarone as per EP recommendations - Patient with newly diagnosed afib at outpatient cardiologist office and started on amiodarone, xarelto, and metoprolol    - will hold xarelto in the setting of SAM, will start heparin gtt  - cards/EP eval called for eval of trifasc block on admission with sinus bradycardia (1st degree AV block) and possible nonemergent PPM  - holding home metoprolol and amiodarone as per EP recommendations

## 2017-11-01 NOTE — H&P ADULT - ASSESSMENT
86yo F w/ PMHx of DM, HTN, hypothyroidism, asthma, newly diagnosed afib BIBEMS yesterday for sob and n/v at home, found to have hyperK in the context of SAM, likely HF exacerbation, and resp eistress. 84yo F w/ PMHx of DM, HTN, hypothyroidism, asthma, newly diagnosed afib BIBEMS yesterday for sob and n/v at home, found to have hyperK in the context of SAM, likely flash pulm edema in setting of hypertensive emergency, and resp distress.

## 2017-11-01 NOTE — H&P ADULT - PROBLEM SELECTOR PLAN 4
- SAM with Cr. 2.5 on admission with associated hyperK and tryptonemia; likely secondary to acute HF and volume overloading/third spacing.  - Nephrology on consult and sent labs for GN w/u.  - Continue to diuresis as needed; strict I&Os. Monitor renal functions.

## 2017-11-01 NOTE — CONSULT NOTE ADULT - SUBJECTIVE AND OBJECTIVE BOX
Chief Complaint:     HPI:    PMH:   Asthma  Diabetes  Hypothyroid  HTN (hypertension)    PSH:   History of renal stent  H/O abdominal hysterectomy    Family History:  FAMILY HISTORY:    Allergies:  Iron 100 (Rash)    Social History:  Smoking:  Alcohol:  Drugs:    Medications:  acetaminophen   Tablet. 650 milliGRAM(s) Oral every 6 hours PRN  ALBUTerol/ipratropium for Nebulization 3 milliLiter(s) Nebulizer every 6 hours  amLODIPine   Tablet 10 milliGRAM(s) Oral daily  buDESOnide   0.5 milliGRAM(s) Respule 0.5 milliGRAM(s) Inhalation two times a day  dextrose 5%. 1000 milliLiter(s) IV Continuous <Continuous>  dextrose 50% Injectable 12.5 Gram(s) IV Push once  dextrose 50% Injectable 25 Gram(s) IV Push once  dextrose 50% Injectable 25 Gram(s) IV Push once  dextrose Gel 1 Dose(s) Oral once PRN  glucagon  Injectable 1 milliGRAM(s) IntraMuscular once PRN  heparin  Infusion.  Unit(s)/Hr IV Continuous <Continuous>  heparin  Injectable 6000 Unit(s) IV Push every 6 hours PRN  heparin  Injectable 3000 Unit(s) IV Push every 6 hours PRN  insulin glargine Injectable (LANTUS) 7 Unit(s) SubCutaneous at bedtime  insulin lispro (HumaLOG) corrective regimen sliding scale   SubCutaneous three times a day before meals  insulin lispro (HumaLOG) corrective regimen sliding scale   SubCutaneous at bedtime  levothyroxine 75 MICROGram(s) Oral daily  methylPREDNISolone sodium succinate Injectable 30 milliGRAM(s) IV Push every 8 hours      Cardiovascular Diagnostic Testing:  ECG:    Echo:    Stress Testing:    Cath:    Imaging:    Labs:                        11.5   9.7   )-----------( 311      ( 31 Oct 2017 20:34 )             35.1         132<L>  |  96  |  36<H>  ----------------------------<  259<H>  5.2   |  22  |  2.47<H>    Ca    9.8      2017 02:21  Phos  4.1       Mg     1.6         TPro  8.1  /  Alb  3.9  /  TBili  0.2  /  DBili  x   /  AST  31  /  ALT  20  /  AlkPhos  80  10-31    PT/INR - ( 31 Oct 2017 20:34 )   PT: 27.0 sec;   INR: 2.45 ratio         PTT - ( 31 Oct 2017 20:34 )  PTT:41.8 sec  CARDIAC MARKERS ( 2017 02:21 )  x     / 0.10 ng/mL / x     / x     / x      CARDIAC MARKERS ( 31 Oct 2017 20:34 )  x     / 0.12 ng/mL / 107 U/L / x     / 5.8 ng/mL      Serum Pro-Brain Natriuretic Peptide: 33391 pg/mL (10-31 @ 20:34)        Thyroid Stimulating Hormone, Serum: 4.36 uIU/mL ( @ 04:31)      Physical Exam:  T(C): 36.4 (17 @ 14:40), Max: 37.2 (10-31-17 @ 20:35)  HR: 57 (17 @ 14:40) (53 - 64)  BP: 141/70 (17 @ 14:40) (140/62 - 223/81)  RR: 20 (17 14:40) (16 - 24)  SpO2: 100% (17 @ 14:40) (98% - 100%)  Wt(kg): --    10-31 @ :  -   @ 07:00  --------------------------------------------------------  IN: 0 mL / OUT: 2000 mL / NET: -2000 mL     @ 07:  -   @ 18:20  --------------------------------------------------------  IN: 100 mL / OUT: 1600 mL / NET: -1500 mL      Daily Height in cm: 162.56 (2017 14:40)    Daily Weight in k.4 (2017 14:40) Chief Complaint: SOB    HPI: 85 F PMH as stated saw PMD the other day who noted something was not right so sent patient to cardiologist. There she was apparently noted to have AF. She was put on amio and Xarelto. Echo also showed LV dysfunction and she was started on lopressor and lisinopril. She continued to feel poorly and presented to the ED. There she was noted to have SBP > 200. She also had acute SOB and flash pulm edema. She was put on bipap. She was noted to be in NSR. SHe was also noted to have ASM with crt of 2.5 and K >6. She was diuresed with improvement in her symptoms.  She was also noted to have first degree avb. LAB and RBBB. Currently she feels better.     PMH:   Asthma  Diabetes  Hypothyroid  HTN (hypertension)    PSH:   History of renal stent  H/O abdominal hysterectomy    Family History:  FAMILY HISTORY:    Allergies:  Iron 100 (Rash)    Social History:  Smoking: None  Alcohol: None  Drugs:    Medications:  acetaminophen   Tablet. 650 milliGRAM(s) Oral every 6 hours PRN  ALBUTerol/ipratropium for Nebulization 3 milliLiter(s) Nebulizer every 6 hours  amLODIPine   Tablet 10 milliGRAM(s) Oral daily  buDESOnide   0.5 milliGRAM(s) Respule 0.5 milliGRAM(s) Inhalation two times a day  dextrose 5%. 1000 milliLiter(s) IV Continuous <Continuous>  dextrose 50% Injectable 12.5 Gram(s) IV Push once  dextrose 50% Injectable 25 Gram(s) IV Push once  dextrose 50% Injectable 25 Gram(s) IV Push once  dextrose Gel 1 Dose(s) Oral once PRN  glucagon  Injectable 1 milliGRAM(s) IntraMuscular once PRN  heparin  Infusion.  Unit(s)/Hr IV Continuous <Continuous>  heparin  Injectable 6000 Unit(s) IV Push every 6 hours PRN  heparin  Injectable 3000 Unit(s) IV Push every 6 hours PRN  insulin glargine Injectable (LANTUS) 7 Unit(s) SubCutaneous at bedtime  insulin lispro (HumaLOG) corrective regimen sliding scale   SubCutaneous three times a day before meals  insulin lispro (HumaLOG) corrective regimen sliding scale   SubCutaneous at bedtime  levothyroxine 75 MICROGram(s) Oral daily  methylPREDNISolone sodium succinate Injectable 30 milliGRAM(s) IV Push every 8 hours      Cardiovascular Diagnostic Testing:  ECG: SInus armand. First degree AVB, LAFB, RBBB. NO acute ischemic changes.     Echo: < from: US TTE 2D F/U, Limited w/o Contrast (ED) (17 @ 01:34) >  No Pericardial Effusion.  Very poor LV systolic function.    < end of copied text >      Stress Testing:    Cath:    Imaging:    Labs:                        11.5   9.7   )-----------( 311      ( 31 Oct 2017 20:34 )             35.1         132<L>  |  96  |  36<H>  ----------------------------<  259<H>  5.2   |  22  |  2.47<H>    Ca    9.8      2017 02:21  Phos  4.1       Mg     1.6         TPro  8.1  /  Alb  3.9  /  TBili  0.2  /  DBili  x   /  AST  31  /  ALT  20  /  AlkPhos  80  10-31    PT/INR - ( 31 Oct 2017 20:34 )   PT: 27.0 sec;   INR: 2.45 ratio         PTT - ( 31 Oct 2017 20:34 )  PTT:41.8 sec  CARDIAC MARKERS ( 2017 02:21 )  x     / 0.10 ng/mL / x     / x     / x      CARDIAC MARKERS ( 31 Oct 2017 20:34 )  x     / 0.12 ng/mL / 107 U/L / x     / 5.8 ng/mL      Serum Pro-Brain Natriuretic Peptide: 52316 pg/mL (10-31 @ 20:34)        Thyroid Stimulating Hormone, Serum: 4.36 uIU/mL ( @ 04:31)      Physical Exam:  T(C): 36.4 (17 @ 14:40), Max: 37.2 (10-31-17 @ 20:35)  HR: 57 (17 @ 14:40) (53 - 64)  BP: 141/70 (17 @ 14:40) (140/62 - 223/81)  RR: 20 (17 @ 14:40) (16 - 24)  SpO2: 100% (17 @ 14:40) (98% - 100%)  Wt(kg): --    10-31 @  @ 07:00  --------------------------------------------------------  IN: 0 mL / OUT: 2000 mL / NET: -2000 mL     @  @ 18:20  --------------------------------------------------------  IN: 100 mL / OUT: 1600 mL / NET: -1500 mL      Daily Height in cm: 162.56 (2017 14:40)    Daily Weight in k.4 (2017 14:40)

## 2017-11-01 NOTE — H&P ADULT - HISTORY OF PRESENT ILLNESS
84yo F w/ PMHx of DM, HTN, hypothyroidism, asthma, newly diagnosed afib BIBEMS yesterday for sob and n/v at home. Patient recently [place] back [place] the [place] 5 [place] ago. She was under normal health status and compliant on her medication until 5 days ago. Patient felt generalized weakness at that time; over the weekend, patient felt worsening sob especially with ambulation/exertion. (Patient at baseline walks with a cane at home freely.) Yesterday, patient started to develop nausea and vomitting 4 episodes NBNB. Patient was not able to tolerate po intake. Otherwise, patient denied fever, chills, no diarrhea/constipation, no chest pain, no dizziness, no urinary burning or difficulty.     In the ER, patient was found to have sinus armand ~55, BP elevated 200s/100s, Sating well 100 on 10/5 35% bipap and remains afebrile. Patient was found to have hyperK to 6.3 in the context of SAM. She was given duonebs x 3, calcium gluconate, regular 10u, lasix 8mg, kayxalate with adquate UOP. She was also given solumedrol 125mg given concerns for asthma exacerbation.

## 2017-11-01 NOTE — CONSULT NOTE ADULT - ASSESSMENT
85 F with acute systolic HF, ?PAF, New ICM, Hypertensive urgency  ·	Given new systolic dysfunction will need echo to confirmed. If confirmed patient well need assessment of coronary arteries. Ideally would be cardiac cath if ok with renal.   ·	Continue to diurese. Strict I and O  ·	Patient is in NSR. Would hold amio as we have not seen AF and patient with significant conduction disease. Hep for now is reasonable.   ·	Holding ACE due to SAM.   ·	Resume beta blocker. Coreg 3.125 BID.   ·	EP input noted. Discussed with Dr. Herron  ·	Further management will be determined after CAD assessed.

## 2017-11-01 NOTE — PROGRESS NOTE ADULT - PROBLEM SELECTOR PLAN 4
- SAM with Cr. 2.5 on admission with associated hyperK and tryptonemia; likely secondary to acute HF and volume overloading/third spacing.  - Nephrology on consult and sent labs for GN w/u.  - Continue to diuresis as needed; strict I&Os. Monitor renal functions. - SAM with Cr. 2.5 on admission with associated hyperkalemia; likely secondary to acute HF. Unknown baseline.  - UA with RBCs and no evidence of infection  - pending glomerulonephritis labs  - Good urine output  - Continue to diuresis as needed; strict I&Os. Monitor renal functions. - SAM with Cr. 2.5 on admission with associated hyperkalemia; likely secondary to acute HF. Unknown baseline.  - UA with RBCs and no evidence of infection  - pending glomerulonephritis labs  - Good UO  - Continue to diuresis as needed; strict I&Os. Monitor renal functions.

## 2017-11-01 NOTE — PROGRESS NOTE ADULT - ATTENDING COMMENTS
Seen, examined around 10am with R1/R2 and agree with plan of care  - Off Bipap now, will put her on O2 2 liter via NC, bronchodilators, IV steroid, O2  Reviewed CXR, labs  - Cardiology consult noted. No AVN blockade, Echo  - On HSS insulin, Hb A1C in am  ** spoke to Dtr-in-Law and grand Dtr

## 2017-11-01 NOTE — H&P ADULT - PROBLEM SELECTOR PLAN 10
- Currently INR 2.45 therapeutically ACed. No need for further chemical AC at this point.  - Diabetes diet.  - Discussed with family who will bring patient's medication list.

## 2017-11-01 NOTE — H&P ADULT - PROBLEM SELECTOR PLAN 7
- Resp status stable on bipap. Continue to trend VBG.  - S/p solumedrol 125mg IV push. Continue with advair and Proair at outpatient dosage. - Resp status stable on bipap. Continue to trend VBG.  - S/p solumedrol 125mg IV push. Continue with advair and Proair at outpatient dosage. Does not appear to be acute exacerbation at this time; no wheezing on exam, no evidence of hypoxia or hypercapnia, and resp distress improved with bipap/BP control.

## 2017-11-01 NOTE — PROGRESS NOTE ADULT - PROBLEM SELECTOR PLAN 2
- , MAP>100 at the time of examination with evidence of respiratory distress/flash edema. Patient likely did not tolerate po bp meds at home yesterday due to repeated vomiting. Of note, patient family reported history of renal artery stenosis requiring stenting.  - s/p hydralazine 10, 5. Continue to monitor BP; goal pressure in first 24 hours is SBP of ~170s; most recent 174.   - Home dosage amlodipine restarted. Lisinopril on hold due to hyperK status and ?ARIANNA.  If persistently >180 in am will consider PO hydralazine.   - Will defer to am team discuss with nephrology regarding resistant HTN treatment in the am, for now pt is at goal and would not want to drop more than 25% in first day. , MAP>100 on admission with respiratory distress  - s/p hydralazine 10 IV push  - continue to monitor BP; goal pressure in first 24 hours is SBP of ~170s  - Home dosage amlodipine restarted. Lisinopril on hold due to hyperK status and ?ARIANNA.  If persistently >180 in am will consider PO hydralazine. , MAP>100 on admission with respiratory distress  - s/p hydralazine 10 IV push  - continue to monitor BP; goal pressure in first 24 hours is SBP of ~170s  - Home dosage amlodipine restarted. Lisinopril on hold due to hyperK status and ?ARIANNA. , MAP>100 on admission with respiratory distress, s/p hydralizine pushes  - continue to monitor BP; goal pressure in first 24 hours is SBP of ~170s

## 2017-11-01 NOTE — H&P ADULT - NSHPREVIEWOFSYSTEMS_GEN_ALL_CORE
REVIEW OF SYSTEMS:    CONSTITUTIONAL: Generalized weakness, no fever/chills  EYES/ENT: No visual changes;  No vertigo or throat pain   NECK: No pain or stiffness  RESPIRATORY: Persistent sob, No cough  CARDIOVASCULAR: No chest pain or palpitations  GASTROINTESTINAL: NBNB emesis x 4; No abdominal or epigastric pain. No diarrhea or constipation. No melena or hematochezia.  GENITOURINARY: No dysuria, frequency or hematuria  NEUROLOGICAL: No numbness or weakness  SKIN: No itching, burning, rashes, or lesions   Endo: no weight loss or gain  Hematology: no easy bleeding/bruise. REVIEW OF SYSTEMS:    CONSTITUTIONAL: Generalized weakness, no fever/chills  EYES: No visual changes  ENT:   No vertigo or throat pain   NECK: No pain or stiffness  RESPIRATORY: Persistent sob, No cough  CARDIOVASCULAR: No chest pain or palpitations  GASTROINTESTINAL: NBNB emesis x 4; No abdominal or epigastric pain. No diarrhea or constipation. No melena or hematochezia.  GENITOURINARY: No dysuria, frequency or hematuria  NEUROLOGICAL: No numbness or weakness  SKIN: No itching, burning, rashes, or lesions   Endo: no weight loss or gain  Hematology: no easy bleeding/bruise.

## 2017-11-01 NOTE — PROGRESS NOTE ADULT - PROBLEM SELECTOR PLAN 8
- Hold off outpatient DM meds given concern for hypoglycemia.  - Monitor FST qAC and qHS. Coverage with [employer] for now.  - Check HbA1c in the am. - Hold off outpatient DM meds given concern for hypoglycemia.  - Monitor FST qAC and qHS. Coverage with [employer] for now.  - Follow up hba1c

## 2017-11-01 NOTE — CONSULT NOTE ADULT - SUBJECTIVE AND OBJECTIVE BOX
Patient seen and evaluated @ 8:00 AM  Chief Complaint: SOB    HPI:  85F w/ PMHx of DM, HTN, hypothyroidism, asthma, newly diagnosed afib on amiodarone/xarelto BIBEMS yesterday for sob and n/v at home. [place]. She was under normal health status and compliant on her medication until 5 days ago. Patient felt generalized weakness at that time; over the weekend, patient felt worsening sob especially with ambulation/exertion. (Patient at baseline walks with a cane at home freely.) Yesterday, patient started to develop nausea and vomiting 4 episodes NBNB. Patient was not able to tolerate po intake. Otherwise, patient denied fever, chills, no diarrhea/constipation, no chest pain, no dizziness, no urinary burning or difficulty.     In the ER, patient was found to have sinus armand ~55, BP elevated 200s/100s, Sating well 100 on 10/5 35% bipap and remains afebrile. Patient was found to have hyperK to 6.3 in the context of SAM. She was given duonebs x 3, calcium gluconate, regular 10u, lasix 8mg, kayxalate with adquate UOP. She was also given solumedrol 125mg given concerns for asthma exacerbation. (01 Nov 2017 02:39)    Upon my evaluation, patient comfortable on biPAP. Conversant minimally in English, cannot hear  over BiPAP.    PMH:   Asthma  Diabetes  Hypothyroid  HTN (hypertension)    PSH:   History of renal stent  H/O abdominal hysterectomy    Home meds:  Amiodarone 400 mg  Lopressor 50 mg  Xarelto 20 mg  Synthroid 75 mcg  Lisinopril 5 mg  Glimepiride 1 mg  Amlodipine 10 mg    Medications:   ALBUTerol/ipratropium for Nebulization 3 milliLiter(s) Nebulizer every 6 hours  amiodarone    Tablet 400 milliGRAM(s) Oral two times a day  buDESOnide   0.5 milliGRAM(s) Respule 0.5 milliGRAM(s) Inhalation two times a day  dextrose 5%. 1000 milliLiter(s) IV Continuous <Continuous>  dextrose 50% Injectable 12.5 Gram(s) IV Push once  dextrose 50% Injectable 25 Gram(s) IV Push once  dextrose 50% Injectable 25 Gram(s) IV Push once  dextrose Gel 1 Dose(s) Oral once PRN  glucagon  Injectable 1 milliGRAM(s) IntraMuscular once PRN  insulin lispro (HumaLOG) corrective regimen sliding scale   SubCutaneous every 6 hours  levothyroxine 75 MICROGram(s) Oral daily  methylPREDNISolone sodium succinate Injectable 30 milliGRAM(s) IV Push every 8 hours  metoprolol     tartrate 50 milliGRAM(s) Oral two times a day    Allergies:  Iron 100 (Rash)    FAMILY HISTORY:  NC    Social History:  NC    Review of Systems:  Constitutional: [ ] Fever [ ] Chills [ ] Fatigue [ ] Weight change   HEENT: [ ] Blurred vision [ ] Eye Pain [ ] Headache [ ] Runny nose [ ] Sore Throat   Respiratory: [ ] Cough [ ] Wheezing [x] Shortness of breath  Cardiovascular: [ ] Chest Pain [ ] Palpitations [ ] POZO [ ] PND [ ] Orthopnea  Gastrointestinal: [ ] Abdominal Pain [ ] Diarrhea [ ] Constipation [ ] Hemorrhoids [ ] Nausea [ ] Vomiting  Genitourinary: [ ] Nocturia [ ] Dysuria [ ] Incontinence  Extremities: [ ] Swelling [ ] Joint Pain  Neurologic: [ ] Focal deficit [ ] Paresthesias [ ] Syncope  Lymphatic: [ ] Swelling [ ] Lymphadenopathy   Skin: [ ] Rash [ ] Ecchymoses [ ] Wounds [ ] Lesions  Psychiatry: [ ] Depression [ ] Suicidal/Homicidal Ideation [ ] Anxiety [ ] Sleep Disturbances  [ ] 10 point review of systems is otherwise negative except as mentioned above            [ ]Unable to obtain    Physical Exam:  T(C): 36.5 (11-01-17 @ 10:03), Max: 37.2 (10-31-17 @ 20:35)  HR: 63 (11-01-17 @ 10:03) (53 - 63)  BP: 178/65 (11-01-17 @ 10:03) (155/77 - 223/81)  RR: 19 (11-01-17 @ 10:03) (16 - 24)  SpO2: 100% (11-01-17 @ 10:03) (98% - 100%)  Wt(kg): --    10-31 @ 07:01  -  11-01 @ 07:00  --------------------------------------------------------  IN: 0 mL / OUT: 2000 mL / NET: -2000 mL    11-01 @ 07:01  - 11-01 @ 10:43  --------------------------------------------------------  IN: 0 mL / OUT: 900 mL / NET: -900 mL      Daily     Daily     Appearance: NAD, on BiPAP  Eyes: PERRL, EOMI  HENT: Normal oral muscosa, NC/AT  Cardiovascular: normal S1 and S2, RRR, no m/r/g, no edema, normal JVP  Respiratory: Clear to auscultation bilaterally  Gastrointestinal: Soft, non-tender, non-distended, BS+  Musculoskeletal: No clubbing, no joint deformity   Neurologic: Non-focal  Lymphatic: No lymphadenopathy  Psychiatry: AAOx3, mood & affect appropriate  Skin: No rashes, no ecchymoses, no cyanosis    Cardiovascular Diagnostic Testing:  ECG: sinus 55 bpm, trifascicular block    Echo:  none	    Stress Testing:  none    Cath:  none    Interpretation of Telemetry: sinus    Imaging:  CXR no consolidation    Labs:                        11.5   9.7   )-----------( 311      ( 31 Oct 2017 20:34 )             35.1     11-01    132<L>  |  96  |  36<H>  ----------------------------<  259<H>  5.2   |  22  |  2.47<H>    Ca    9.8      01 Nov 2017 02:21  Phos  4.1     11-01  Mg     1.6     11-01    TPro  8.1  /  Alb  3.9  /  TBili  0.2  /  DBili  x   /  AST  31  /  ALT  20  /  AlkPhos  80  10-31    PT/INR - ( 31 Oct 2017 20:34 )   PT: 27.0 sec;   INR: 2.45 ratio         PTT - ( 31 Oct 2017 20:34 )  PTT:41.8 sec  CARDIAC MARKERS ( 01 Nov 2017 02:21 )  x     / 0.10 ng/mL / x     / x     / x      CARDIAC MARKERS ( 31 Oct 2017 20:34 )  x     / 0.12 ng/mL / 107 U/L / x     / 5.8 ng/mL      Serum Pro-Brain Natriuretic Peptide: 88420 pg/mL (10-31 @ 20:34)    Thyroid Stimulating Hormone, Serum: 4.36 uIU/mL (11-01 @ 04:31)

## 2017-11-01 NOTE — H&P ADULT - PROBLEM SELECTOR PLAN 3
- S/p calcium gluconate, lasix, regular insulin, kayxalate.  - Continue to trend BMP in the am.  - Strict I&Os for UOP.  - Nephrology on consult; no need for emergent dialysis.

## 2017-11-01 NOTE — PROGRESS NOTE ADULT - PROBLEM SELECTOR PLAN 9
- C/w outpatient dosage synthroid 75mcg daily.  - Check TSH in the am. - C/w outpatient dosage synthroid 75mcg daily.  - Follow up TSH

## 2017-11-01 NOTE — H&P ADULT - NSHPPHYSICALEXAM_GEN_ALL_CORE
T(C): 37.2 (10-31-17 @ 20:35), Max: 37.2 (10-31-17 @ 20:35)  HR: 53 (11-01-17 @ 01:53) (53 - 59)  BP: 190/66 (11-01-17 @ 01:53) (186/107 - 200/92)  RR: 16 (11-01-17 @ 01:53) (16 - 24)  SpO2: 100% (11-01-17 @ 01:53) (98% - 100%)  Wt(kg): --  GENERAL: NAD, well-developed  HEAD:  Atraumatic, Normocephalic  EYES: EOMI, PERRLA, conjunctiva and sclera clear  NECK: Supple, No JVD  CHEST/LUNG: Bilateral rhonchi; difficult to ausculate with bipap placement.  HEART: Regular rate and rhythm; No murmurs, rubs, or gallops  ABDOMEN: Soft, Nontender, Nondistended; Bowel sounds present  EXTREMITIES:  2+ Peripheral Pulses, No clubbing, cyanosis, or edema  PSYCH: AAOx3  NEUROLOGY: non-focal  SKIN: No rashes or lesions T(C): 37.2 (10-31-17 @ 20:35), Max: 37.2 (10-31-17 @ 20:35)  HR: 53 (11-01-17 @ 01:53) (53 - 59)  BP: 190/66 (11-01-17 @ 01:53) (186/107 - 200/92)  RR: 16 (11-01-17 @ 01:53) (16 - 24)  SpO2: 100% (11-01-17 @ 01:53) (98% - 100%)  Wt(kg): --  GENERAL: NAD, well-developed  HEAD:  Atraumatic, Normocephalic  EYES: EOMI, PERRLA, conjunctiva and sclera clear  NECK: Supple, No JVD  CHEST/LUNG: Bilateral rhonchi  HEART: Regular rate and rhythm; No murmurs, rubs, or gallops, no edema b/l   ABDOMEN: Soft, Nontender, Nondistended; Bowel sounds present  EXTREMITIES:  2+ Peripheral Pulses, No clubbing, cyanosis,  PSYCH: AAOx3  NEUROLOGY: non-focal  SKIN: No rashes or lesions

## 2017-11-01 NOTE — CONSULT NOTE ADULT - ASSESSMENT
Patient is an 86 y/o F with medical history significant for DM2, HTN, renal stent who presents with shortness of breath and SAM with hyperkalemia.

## 2017-11-01 NOTE — H&P ADULT - PROBLEM SELECTOR PLAN 8
- Hold off outpatient DM meds given concern for hypoglycemia.  - Monitor FST qAC and qHS. Coverage with [employer] for now.  - Check HbA1c in the am.

## 2017-11-01 NOTE — PROGRESS NOTE ADULT - SUBJECTIVE AND OBJECTIVE BOX
CC: sob + nausea/vomiting    HPI/INTERVAL HISTORY:  Patient seen and examined at bedside.    OBJECTIVE:  VITAL SIGNS:  ICU Vital Signs Last 24 Hrs  T(C): 35.9 (2017 04:23), Max: 37.2 (31 Oct 2017 20:35)  T(F): 96.6 (2017 04:23), Max: 98.9 (31 Oct 2017 20:35)  HR: 61 (2017 05:11) (53 - 61)  BP: 155/77 (2017 05:11) (155/77 - 223/81)  BP(mean): --  ABP: --  ABP(mean): --  RR: 19 (2017 05:11) (16 - 24)  SpO2: 100% (2017 05:11) (98% - 100%)        10-31 @ : @ 07:00  --------------------------------------------------------  IN: 0 mL / OUT: 2000 mL / NET: -2000 mL     @ : @ 09:10  --------------------------------------------------------  IN: 0 mL / OUT: 900 mL / NET: -900 mL      CAPILLARY BLOOD GLUCOSE      POCT Blood Glucose.: 328 mg/dL (31 Oct 2017 22:28)      PHYSICAL EXAM:  Gen:   HEENT: NC/AT; PERRL, anicteric sclera  Neck: supple, no JVD  Resp: clear to ausculation B/L; no wheezes, rales or rhonchi  Cardiovasc: S1S2 normal; RRR; no murmurs, rubs or gallops  GI: soft, nondistended, nontender; +BS  Extr: warm, well-perfused, PT/DP pulses 2+ B/L; no LE edema  Skin: normal color and turgor  Neuro:     LABS:                        11.5   9.7   )-----------( 311      ( 31 Oct 2017 20:34 )             35.1     11-    132<L>  |  96  |  36<H>  ----------------------------<  259<H>  5.2   |  22  |  2.47<H>    Ca    9.8      2017 02:21  Phos  4.1       Mg     1.6         TPro  8.1  /  Alb  3.9  /  TBili  0.2  /  DBili  x   /  AST  31  /  ALT  20  /  AlkPhos  80  10-31    LIVER FUNCTIONS - ( 31 Oct 2017 20:34 )  Alb: 3.9 g/dL / Pro: 8.1 g/dL / ALK PHOS: 80 U/L / ALT: 20 U/L RC / AST: 31 U/L / GGT: x           PT/INR - ( 31 Oct 2017 20:34 )   PT: 27.0 sec;   INR: 2.45 ratio         PTT - ( 31 Oct 2017 20:34 )  PTT:41.8 sec  Urinalysis Basic - ( 31 Oct 2017 21:14 )    Color: Yellow / Appearance: Clear / S.013 / pH: x  Gluc: x / Ketone: Negative  / Bili: Negative / Urobili: Negative   Blood: x / Protein: 150 mg/dL / Nitrite: Negative   Leuk Esterase: Negative / RBC: 10-25 /HPF / WBC 3-5 /HPF   Sq Epi: x / Non Sq Epi: Few /HPF / Bacteria: x      CARDIAC MARKERS ( 2017 02:21 )  x     / 0.10 ng/mL / x     / x     / x      CARDIAC MARKERS ( 31 Oct 2017 20:34 )  x     / 0.12 ng/mL / 107 U/L / x     / 5.8 ng/mL        RADIOLOGY & ADDITIONAL TESTS: Reviewed.    ALBUTerol/ipratropium for Nebulization 3 milliLiter(s) Nebulizer every 6 hours  amLODIPine   Tablet 10 milliGRAM(s) Oral daily  buDESOnide 160 MICROgram(s)/formoterol 4.5 MICROgram(s) Inhaler 2 Puff(s) Inhalation two times a day  dextrose 5%. 1000 milliLiter(s) IV Continuous <Continuous>  dextrose 50% Injectable 12.5 Gram(s) IV Push once  dextrose 50% Injectable 25 Gram(s) IV Push once  dextrose 50% Injectable 25 Gram(s) IV Push once  dextrose Gel 1 Dose(s) Oral once PRN  furosemide   Injectable 40 milliGRAM(s) IV Push two times a day  glucagon  Injectable 1 milliGRAM(s) IntraMuscular once PRN  insulin lispro (HumaLOG) corrective regimen sliding scale   SubCutaneous every 6 hours  levothyroxine 75 MICROGram(s) Oral daily  rivaroxaban 15 milliGRAM(s) Oral every 24 hours      Iron 100 (Rash) CC: sob + nausea/vomiting    [employer] Intern: Guillermo Christineeras  Spectralink: [phone]  Pager: [phone]    HPI/INTERVAL HISTORY:  Patient seen and examined at bedside this AM. Patient denies chest pain, shortness of breath, palpitations, diaphoresis, fevers/chills, diarrhea/constipation.    OBJECTIVE:  VITAL SIGNS:  ICU Vital Signs Last 24 Hrs  T(C): 35.9 (2017 04:23), Max: 37.2 (31 Oct 2017 20:35)  T(F): 96.6 (2017 04:23), Max: 98.9 (31 Oct 2017 20:35)  HR: 61 (2017 05:11) (53 - 61)  BP: 155/77 (2017 05:11) (155/77 - 223/81)  BP(mean): --  ABP: --  ABP(mean): --  RR: 19 (2017 05:11) (16 - 24)  SpO2: 100% (2017 05:11) (98% - 100%)        10-31 @ : @ 07:00  --------------------------------------------------------  IN: 0 mL / OUT: 2000 mL / NET: -2000 mL     @ 07: @ 09:10  --------------------------------------------------------  IN: 0 mL / OUT: 900 mL / NET: -900 mL      CAPILLARY BLOOD GLUCOSE      POCT Blood Glucose.: 328 mg/dL (31 Oct 2017 22:28)      PHYSICAL EXAM:  Gen:   HEENT: NC/AT; PERRL, anicteric sclera  Neck: supple, no JVD  Resp: clear to ausculation B/L; no wheezes, rales or rhonchi  Cardiovasc: S1S2 normal; RRR; no murmurs, rubs or gallops  GI: soft, nondistended, nontender; +BS  Extr: warm, well-perfused, PT/DP pulses 2+ B/L; no LE edema  Skin: normal color and turgor  Neuro:     LABS:                        11.5   9.7   )-----------( 311      ( 31 Oct 2017 20:34 )             35.1         132<L>  |  96  |  36<H>  ----------------------------<  259<H>  5.2   |  22  |  2.47<H>    Ca    9.8      2017 02:21  Phos  4.1       Mg     1.6         TPro  8.1  /  Alb  3.9  /  TBili  0.2  /  DBili  x   /  AST  31  /  ALT  20  /  AlkPhos  80  10    LIVER FUNCTIONS - ( 31 Oct 2017 20:34 )  Alb: 3.9 g/dL / Pro: 8.1 g/dL / ALK PHOS: 80 U/L / ALT: 20 U/L RC / AST: 31 U/L / GGT: x           PT/INR - ( 31 Oct 2017 20:34 )   PT: 27.0 sec;   INR: 2.45 ratio         PTT - ( 31 Oct 2017 20:34 )  PTT:41.8 sec  Urinalysis Basic - ( 31 Oct 2017 21:14 )    Color: Yellow / Appearance: Clear / S.013 / pH: x  Gluc: x / Ketone: Negative  / Bili: Negative / Urobili: Negative   Blood: x / Protein: 150 mg/dL / Nitrite: Negative   Leuk Esterase: Negative / RBC: 10-25 /HPF / WBC 3-5 /HPF   Sq Epi: x / Non Sq Epi: Few /HPF / Bacteria: x      CARDIAC MARKERS ( 2017 02:21 )  x     / 0.10 ng/mL / x     / x     / x      CARDIAC MARKERS ( 31 Oct 2017 20:34 )  x     / 0.12 ng/mL / 107 U/L / x     / 5.8 ng/mL        RADIOLOGY & ADDITIONAL TESTS: Reviewed.    ALBUTerol/ipratropium for Nebulization 3 milliLiter(s) Nebulizer every 6 hours  amLODIPine   Tablet 10 milliGRAM(s) Oral daily  buDESOnide 160 MICROgram(s)/formoterol 4.5 MICROgram(s) Inhaler 2 Puff(s) Inhalation two times a day  dextrose 5%. 1000 milliLiter(s) IV Continuous <Continuous>  dextrose 50% Injectable 12.5 Gram(s) IV Push once  dextrose 50% Injectable 25 Gram(s) IV Push once  dextrose 50% Injectable 25 Gram(s) IV Push once  dextrose Gel 1 Dose(s) Oral once PRN  furosemide   Injectable 40 milliGRAM(s) IV Push two times a day  glucagon  Injectable 1 milliGRAM(s) IntraMuscular once PRN  insulin lispro (HumaLOG) corrective regimen sliding scale   SubCutaneous every 6 hours  levothyroxine 75 MICROGram(s) Oral daily  rivaroxaban 15 milliGRAM(s) Oral every 24 hours      Iron 100 (Rash) CC: sob + nausea/vomiting    [employer] Intern: Guillermo Christineeras  Spectralink: [phone]  Pager: [phone]    HPI/INTERVAL HISTORY:  Patient seen and examined at bedside this AM. Patient denies chest pain, shortness of breath, palpitations, diaphoresis, fevers/chills, diarrhea/constipation.    OBJECTIVE:  VITAL SIGNS:  ICU Vital Signs Last 24 Hrs  T(C): 35.9 (2017 04:23), Max: 37.2 (31 Oct 2017 20:35)  T(F): 96.6 (2017 04:23), Max: 98.9 (31 Oct 2017 20:35)  HR: 61 (2017 05:11) (53 - 61)  BP: 155/77 (2017 05:11) (155/77 - 223/81)  BP(mean): --  ABP: --  ABP(mean): --  RR: 19 (2017 05:11) (16 - 24)  SpO2: 100% (2017 05:11) (98% - 100%)        10-31 @ : @ 07:00  --------------------------------------------------------  IN: 0 mL / OUT: 2000 mL / NET: -2000 mL     @ 07: @ 09:10  --------------------------------------------------------  IN: 0 mL / OUT: 900 mL / NET: -900 mL      CAPILLARY BLOOD GLUCOSE      POCT Blood Glucose.: 328 mg/dL (31 Oct 2017 22:28)    PHYSICAL EXAM:  Appearance: AOx3, NAD in bed, on BIPAP  HEENT: MMM, PERRL, EOMI, NC/AT  Cardiovascular: normal S1 and S2, RRR, no m/r/g, no edema, normal JVP  Respiratory: Clear to auscultation bilaterally  Gastrointestinal: Soft, non-tender, non-distended, BS+  Musculoskeletal: No clubbing, no joint deformity   Neurologic: Non-focal  Lymphatic: No lymphadenopathy  Psychiatry: AAOx3, mood & affect appropriate  Skin: No rashes, no ecchymoses, no cyanosis    LABS:                        11.5   9.7   )-----------( 311      ( 31 Oct 2017 20:34 )             35.1     11-    132<L>  |  96  |  36<H>  ----------------------------<  259<H>  5.2   |  22  |  2.47<H>    Ca    9.8      2017 02:21  Phos  4.1     11  Mg     1.6         TPro  8.1  /  Alb  3.9  /  TBili  0.2  /  DBili  x   /  AST  31  /  ALT  20  /  AlkPhos  80  10-31    LIVER FUNCTIONS - ( 31 Oct 2017 20:34 )  Alb: 3.9 g/dL / Pro: 8.1 g/dL / ALK PHOS: 80 U/L / ALT: 20 U/L RC / AST: 31 U/L / GGT: x           PT/INR - ( 31 Oct 2017 20:34 )   PT: 27.0 sec;   INR: 2.45 ratio         PTT - ( 31 Oct 2017 20:34 )  PTT:41.8 sec  Urinalysis Basic - ( 31 Oct 2017 21:14 )    Color: Yellow / Appearance: Clear / S.013 / pH: x  Gluc: x / Ketone: Negative  / Bili: Negative / Urobili: Negative   Blood: x / Protein: 150 mg/dL / Nitrite: Negative   Leuk Esterase: Negative / RBC: 10-25 /HPF / WBC 3-5 /HPF   Sq Epi: x / Non Sq Epi: Few /HPF / Bacteria: x      CARDIAC MARKERS ( 2017 02:21 )  x     / 0.10 ng/mL / x     / x     / x      CARDIAC MARKERS ( 31 Oct 2017 20:34 )  x     / 0.12 ng/mL / 107 U/L / x     / 5.8 ng/mL        RADIOLOGY & ADDITIONAL TESTS: Reviewed.    ALBUTerol/ipratropium for Nebulization 3 milliLiter(s) Nebulizer every 6 hours  amLODIPine   Tablet 10 milliGRAM(s) Oral daily  buDESOnide 160 MICROgram(s)/formoterol 4.5 MICROgram(s) Inhaler 2 Puff(s) Inhalation two times a day  dextrose 5%. 1000 milliLiter(s) IV Continuous <Continuous>  dextrose 50% Injectable 12.5 Gram(s) IV Push once  dextrose 50% Injectable 25 Gram(s) IV Push once  dextrose 50% Injectable 25 Gram(s) IV Push once  dextrose Gel 1 Dose(s) Oral once PRN  furosemide   Injectable 40 milliGRAM(s) IV Push two times a day  glucagon  Injectable 1 milliGRAM(s) IntraMuscular once PRN  insulin lispro (HumaLOG) corrective regimen sliding scale   SubCutaneous every 6 hours  levothyroxine 75 MICROGram(s) Oral daily  rivaroxaban 15 milliGRAM(s) Oral every 24 hours      Iron 100 (Rash) CC: sob + nausea/vomiting    [employer] Intern: Guillermo Christineeras  Spectralink: [phone]  Pager: [phone]    HPI/INTERVAL HISTORY:  Patient seen and examined at bedside this AM. Patient denies chest pain, shortness of breath, palpitations, diaphoresis, fevers/chills, diarrhea/constipation.    OBJECTIVE:  VITAL SIGNS:  ICU Vital Signs Last 24 Hrs  T(C): 35.9 (2017 04:23), Max: 37.2 (31 Oct 2017 20:35)  T(F): 96.6 (2017 04:23), Max: 98.9 (31 Oct 2017 20:35)  HR: 61 (2017 05:11) (53 - 61)  BP: 155/77 (2017 05:11) (155/77 - 223/81)  BP(mean): --  ABP: -  ABP(mean): --  RR: 19 (2017 05:11) (16 - 24)  SpO2: 100% (2017 05:11) (98% - 100%)        10-31 @ : @ 07:00  --------------------------------------------------------  IN: 0 mL / OUT: 2000 mL / NET: -2000 mL     @ 07: @ 09:10  --------------------------------------------------------  IN: 0 mL / OUT: 900 mL / NET: -900 mL      CAPILLARY BLOOD GLUCOSE      POCT Blood Glucose.: 328 mg/dL (31 Oct 2017 22:28)    PHYSICAL EXAM:  Appearance: AOx3, NAD in bed, on BIPAP  HEENT: MMM, PERRL, EOMI, NC/AT  Cardiovascular: normal S1 and S2, RRR, no m/r/g, no edema, normal JVP  Respiratory: Clear to auscultation bilaterally  Gastrointestinal: Soft, non-tender, non-distended, BS+  Musculoskeletal: No clubbing, no joint deformity   Neurologic: Non-focal  Lymphatic: No lymphadenopathy  Psychiatry: AAOx3, mood & affect appropriate  Skin: No rashes, no ecchymoses, no cyanosis    LABS:                        11.5   9.7   )-----------( 311      ( 31 Oct 2017 20:34 )             35.1     11-    132<L>  |  96  |  36<H>  ----------------------------<  259<H>  5.2   |  22  |  2.47<H>    Ca    9.8      2017 02:21  Phos  4.1       Mg     1.6         TPro  8.1  /  Alb  3.9  /  TBili  0.2  /  DBili  x   /  AST  31  /  ALT  20  /  AlkPhos  80  10-31    LIVER FUNCTIONS - ( 31 Oct 2017 20:34 )  Alb: 3.9 g/dL / Pro: 8.1 g/dL / ALK PHOS: 80 U/L / ALT: 20 U/L RC / AST: 31 U/L / GGT: x           PT/INR - ( 31 Oct 2017 20:34 )   PT: 27.0 sec;   INR: 2.45 ratio         PTT - ( 31 Oct 2017 20:34 )  PTT:41.8 sec  Urinalysis Basic - ( 31 Oct 2017 21:14 )    Color: Yellow / Appearance: Clear / S.013 / pH: x  Gluc: x / Ketone: Negative  / Bili: Negative / Urobili: Negative   Blood: x / Protein: 150 mg/dL / Nitrite: Negative   Leuk Esterase: Negative / RBC: 10-25 /HPF / WBC 3-5 /HPF   Sq Epi: x / Non Sq Epi: Few /HPF / Bacteria: x      CARDIAC MARKERS ( 2017 02:21 )  x     / 0.10 ng/mL / x     / x     / x      CARDIAC MARKERS ( 31 Oct 2017 20:34 )  x     / 0.12 ng/mL / 107 U/L / x     / 5.8 ng/mL        RADIOLOGY & ADDITIONAL TESTS: Reviewed.    ALBUTerol/ipratropium for Nebulization 3 milliLiter(s) Nebulizer every 6 hours  amLODIPine   Tablet 10 milliGRAM(s) Oral daily  buDESOnide 160 MICROgram(s)/formoterol 4.5 MICROgram(s) Inhaler 2 Puff(s) Inhalation two times a day  dextrose 5%. 1000 milliLiter(s) IV Continuous <Continuous>  dextrose 50% Injectable 12.5 Gram(s) IV Push once  dextrose 50% Injectable 25 Gram(s) IV Push once  dextrose 50% Injectable 25 Gram(s) IV Push once  dextrose Gel 1 Dose(s) Oral once PRN  furosemide   Injectable 40 milliGRAM(s) IV Push two times a day  glucagon  Injectable 1 milliGRAM(s) IntraMuscular once PRN  insulin lispro (HumaLOG) corrective regimen sliding scale   SubCutaneous every 6 hours  levothyroxine 75 MICROGram(s) Oral daily  rivaroxaban 15 milliGRAM(s) Oral every 24 hours      Iron 100 (Rash)

## 2017-11-01 NOTE — PROGRESS NOTE ADULT - PROBLEM SELECTOR PLAN 10
- Currently INR 2.45 therapeutically ACed. No need for further chemical AC at this point.  - Diabetes diet.  - Discussed with family who will bring patient's medication list. - Currently INR 2.45 therapeutically ACed  - Diabetes diet. - INR 2.45 today  - Diabetes diet

## 2017-11-02 DIAGNOSIS — D64.9 ANEMIA, UNSPECIFIED: ICD-10-CM

## 2017-11-02 DIAGNOSIS — R69 ILLNESS, UNSPECIFIED: ICD-10-CM

## 2017-11-02 DIAGNOSIS — I50.31 ACUTE DIASTOLIC (CONGESTIVE) HEART FAILURE: ICD-10-CM

## 2017-11-02 DIAGNOSIS — E87.1 HYPO-OSMOLALITY AND HYPONATREMIA: ICD-10-CM

## 2017-11-02 LAB
ANION GAP SERPL CALC-SCNC: 18 MMOL/L — HIGH (ref 5–17)
APTT BLD: 139.1 SEC — CRITICAL HIGH (ref 27.5–37.4)
APTT BLD: 162.7 SEC — CRITICAL HIGH (ref 27.5–37.4)
APTT BLD: 79.4 SEC — HIGH (ref 27.5–37.4)
BUN SERPL-MCNC: 50 MG/DL — HIGH (ref 7–23)
CALCIUM SERPL-MCNC: 8.7 MG/DL — SIGNIFICANT CHANGE UP (ref 8.4–10.5)
CHLORIDE SERPL-SCNC: 89 MMOL/L — LOW (ref 96–108)
CO2 SERPL-SCNC: 19 MMOL/L — LOW (ref 22–31)
CREAT SERPL-MCNC: 3.06 MG/DL — HIGH (ref 0.5–1.3)
GLUCOSE BLDC GLUCOMTR-MCNC: 217 MG/DL — HIGH (ref 70–99)
GLUCOSE BLDC GLUCOMTR-MCNC: 240 MG/DL — HIGH (ref 70–99)
GLUCOSE BLDC GLUCOMTR-MCNC: 243 MG/DL — HIGH (ref 70–99)
GLUCOSE BLDC GLUCOMTR-MCNC: 299 MG/DL — HIGH (ref 70–99)
GLUCOSE SERPL-MCNC: 204 MG/DL — HIGH (ref 70–99)
HBA1C BLD-MCNC: 5.6 % — SIGNIFICANT CHANGE UP (ref 4–5.6)
HCT VFR BLD CALC: 31.1 % — LOW (ref 34.5–45)
HCT VFR BLD CALC: 32.8 % — LOW (ref 34.5–45)
HGB BLD-MCNC: 10.7 G/DL — LOW (ref 11.5–15.5)
HGB BLD-MCNC: 11.1 G/DL — LOW (ref 11.5–15.5)
LACTATE SERPL-SCNC: 1.1 MMOL/L — SIGNIFICANT CHANGE UP (ref 0.7–2)
MAGNESIUM SERPL-MCNC: 1.5 MG/DL — LOW (ref 1.6–2.6)
MCHC RBC-ENTMCNC: 29.4 PG — SIGNIFICANT CHANGE UP (ref 27–34)
MCHC RBC-ENTMCNC: 31.5 PG — SIGNIFICANT CHANGE UP (ref 27–34)
MCHC RBC-ENTMCNC: 33.9 GM/DL — SIGNIFICANT CHANGE UP (ref 32–36)
MCHC RBC-ENTMCNC: 34.4 GM/DL — SIGNIFICANT CHANGE UP (ref 32–36)
MCV RBC AUTO: 85.4 FL — SIGNIFICANT CHANGE UP (ref 80–100)
MCV RBC AUTO: 92.9 FL — SIGNIFICANT CHANGE UP (ref 80–100)
OSMOLALITY SERPL: 292 MOS/KG — SIGNIFICANT CHANGE UP (ref 275–300)
OSMOLALITY UR: 274 MOS/KG — SIGNIFICANT CHANGE UP (ref 50–1200)
PHOSPHATE SERPL-MCNC: 4.4 MG/DL — SIGNIFICANT CHANGE UP (ref 2.5–4.5)
PLATELET # BLD AUTO: 293 K/UL — SIGNIFICANT CHANGE UP (ref 150–400)
PLATELET # BLD AUTO: 328 K/UL — SIGNIFICANT CHANGE UP (ref 150–400)
POTASSIUM SERPL-MCNC: 5 MMOL/L — SIGNIFICANT CHANGE UP (ref 3.5–5.3)
POTASSIUM SERPL-SCNC: 5 MMOL/L — SIGNIFICANT CHANGE UP (ref 3.5–5.3)
RBC # BLD: 3.53 M/UL — LOW (ref 3.8–5.2)
RBC # BLD: 3.64 M/UL — LOW (ref 3.8–5.2)
RBC # FLD: 12 % — SIGNIFICANT CHANGE UP (ref 10.3–14.5)
RBC # FLD: 13.1 % — SIGNIFICANT CHANGE UP (ref 10.3–14.5)
SODIUM SERPL-SCNC: 126 MMOL/L — LOW (ref 135–145)
SODIUM UR-SCNC: 23 MMOL/L — SIGNIFICANT CHANGE UP
WBC # BLD: 10.59 K/UL — HIGH (ref 3.8–10.5)
WBC # BLD: 9.4 K/UL — SIGNIFICANT CHANGE UP (ref 3.8–10.5)
WBC # FLD AUTO: 10.59 K/UL — HIGH (ref 3.8–10.5)
WBC # FLD AUTO: 9.4 K/UL — SIGNIFICANT CHANGE UP (ref 3.8–10.5)

## 2017-11-02 PROCEDURE — 93010 ELECTROCARDIOGRAM REPORT: CPT

## 2017-11-02 PROCEDURE — 99232 SBSQ HOSP IP/OBS MODERATE 35: CPT

## 2017-11-02 PROCEDURE — 93306 TTE W/DOPPLER COMPLETE: CPT | Mod: 26

## 2017-11-02 PROCEDURE — 99233 SBSQ HOSP IP/OBS HIGH 50: CPT | Mod: GC

## 2017-11-02 PROCEDURE — 76775 US EXAM ABDO BACK WALL LIM: CPT | Mod: 26

## 2017-11-02 RX ORDER — LEVALBUTEROL 1.25 MG/.5ML
0.63 SOLUTION, CONCENTRATE RESPIRATORY (INHALATION) EVERY 8 HOURS
Qty: 0 | Refills: 0 | Status: DISCONTINUED | OUTPATIENT
Start: 2017-11-02 | End: 2017-11-08

## 2017-11-02 RX ORDER — SODIUM CHLORIDE 9 MG/ML
1000 INJECTION INTRAMUSCULAR; INTRAVENOUS; SUBCUTANEOUS
Qty: 0 | Refills: 0 | Status: DISCONTINUED | OUTPATIENT
Start: 2017-11-02 | End: 2017-11-02

## 2017-11-02 RX ORDER — SODIUM CHLORIDE 9 MG/ML
1000 INJECTION INTRAMUSCULAR; INTRAVENOUS; SUBCUTANEOUS
Qty: 0 | Refills: 0 | Status: DISCONTINUED | OUTPATIENT
Start: 2017-11-02 | End: 2017-11-04

## 2017-11-02 RX ORDER — MAGNESIUM SULFATE 500 MG/ML
1 VIAL (ML) INJECTION ONCE
Qty: 0 | Refills: 0 | Status: COMPLETED | OUTPATIENT
Start: 2017-11-02 | End: 2017-11-02

## 2017-11-02 RX ORDER — AMIODARONE HYDROCHLORIDE 400 MG/1
400 TABLET ORAL DAILY
Qty: 0 | Refills: 0 | Status: DISCONTINUED | OUTPATIENT
Start: 2017-11-02 | End: 2017-11-03

## 2017-11-02 RX ORDER — METOPROLOL TARTRATE 50 MG
12.5 TABLET ORAL
Qty: 0 | Refills: 0 | Status: DISCONTINUED | OUTPATIENT
Start: 2017-11-02 | End: 2017-11-08

## 2017-11-02 RX ORDER — CARVEDILOL PHOSPHATE 80 MG/1
3.12 CAPSULE, EXTENDED RELEASE ORAL EVERY 12 HOURS
Qty: 0 | Refills: 0 | Status: DISCONTINUED | OUTPATIENT
Start: 2017-11-02 | End: 2017-11-02

## 2017-11-02 RX ADMIN — LEVALBUTEROL 0.63 MILLIGRAM(S): 1.25 SOLUTION, CONCENTRATE RESPIRATORY (INHALATION) at 22:10

## 2017-11-02 RX ADMIN — HEPARIN SODIUM 0 UNIT(S)/HR: 5000 INJECTION INTRAVENOUS; SUBCUTANEOUS at 10:03

## 2017-11-02 RX ADMIN — Medication 650 MILLIGRAM(S): at 15:58

## 2017-11-02 RX ADMIN — Medication 3 MILLILITER(S): at 11:23

## 2017-11-02 RX ADMIN — Medication 3 MILLILITER(S): at 05:14

## 2017-11-02 RX ADMIN — HEPARIN SODIUM 900 UNIT(S)/HR: 5000 INJECTION INTRAVENOUS; SUBCUTANEOUS at 18:52

## 2017-11-02 RX ADMIN — Medication 12.5 MILLIGRAM(S): at 17:10

## 2017-11-02 RX ADMIN — SODIUM CHLORIDE 50 MILLILITER(S): 9 INJECTION INTRAMUSCULAR; INTRAVENOUS; SUBCUTANEOUS at 15:46

## 2017-11-02 RX ADMIN — SODIUM CHLORIDE 50 MILLILITER(S): 9 INJECTION INTRAMUSCULAR; INTRAVENOUS; SUBCUTANEOUS at 17:11

## 2017-11-02 RX ADMIN — Medication 100 GRAM(S): at 12:04

## 2017-11-02 RX ADMIN — Medication 0.5 MILLIGRAM(S): at 05:01

## 2017-11-02 RX ADMIN — Medication 5 MILLIGRAM(S): at 22:11

## 2017-11-02 RX ADMIN — HEPARIN SODIUM 1100 UNIT(S)/HR: 5000 INJECTION INTRAVENOUS; SUBCUTANEOUS at 02:45

## 2017-11-02 RX ADMIN — Medication 30 MILLIGRAM(S): at 05:14

## 2017-11-02 RX ADMIN — Medication 2: at 12:24

## 2017-11-02 RX ADMIN — Medication 3 MILLILITER(S): at 00:45

## 2017-11-02 RX ADMIN — Medication 2: at 08:35

## 2017-11-02 RX ADMIN — HEPARIN SODIUM 900 UNIT(S)/HR: 5000 INJECTION INTRAVENOUS; SUBCUTANEOUS at 11:21

## 2017-11-02 RX ADMIN — LEVALBUTEROL 0.63 MILLIGRAM(S): 1.25 SOLUTION, CONCENTRATE RESPIRATORY (INHALATION) at 14:52

## 2017-11-02 RX ADMIN — Medication 650 MILLIGRAM(S): at 16:30

## 2017-11-02 RX ADMIN — Medication 0.5 MILLIGRAM(S): at 17:10

## 2017-11-02 RX ADMIN — INSULIN GLARGINE 7 UNIT(S): 100 INJECTION, SOLUTION SUBCUTANEOUS at 22:11

## 2017-11-02 RX ADMIN — Medication 3: at 17:09

## 2017-11-02 RX ADMIN — Medication 650 MILLIGRAM(S): at 01:00

## 2017-11-02 RX ADMIN — HEPARIN SODIUM 0 UNIT(S)/HR: 5000 INJECTION INTRAVENOUS; SUBCUTANEOUS at 01:36

## 2017-11-02 RX ADMIN — Medication 650 MILLIGRAM(S): at 00:20

## 2017-11-02 RX ADMIN — Medication 75 MICROGRAM(S): at 05:14

## 2017-11-02 NOTE — PROGRESS NOTE ADULT - ASSESSMENT
Patient is an 85yoF w/ recent Afib, recurrent UTIs, CKD, DM (for more than 15 years), who p/w SOB, hyperK and ?SAM. Renal called for SAM.

## 2017-11-02 NOTE — PROGRESS NOTE ADULT - SUBJECTIVE AND OBJECTIVE BOX
[facility] DIVISION OF KIDNEY DISEASES AND HYPERTENSION -- FOLLOW UP NOTE  --------------------------------------------------------------------------------  Chief Complaint: SAM vs. CKD    24 hour events/subjective: Pt currently feeling okay. Daughter at bedside. Pt has had long Hx of recurrent UTIs over the past 10 years, and has known about having a kidney disease for that time. Says she has had a ureteral stent placed and removed in the recent past. Sees a Nephrologist in [facility]. Does not know her baseline renal fn however.         PAST HISTORY  --------------------------------------------------------------------------------  No significant changes to PMH, PSH, FHx, SHx, unless otherwise noted    ALLERGIES & MEDICATIONS  --------------------------------------------------------------------------------  Allergies    Iron 100 (Rash)    Intolerances      Standing Inpatient Medications  amLODIPine   Tablet 10 milliGRAM(s) Oral daily  buDESOnide   0.5 milliGRAM(s) Respule 0.5 milliGRAM(s) Inhalation two times a day  carvedilol 3.125 milliGRAM(s) Oral every 12 hours  dextrose 5%. 1000 milliLiter(s) IV Continuous <Continuous>  dextrose 50% Injectable 12.5 Gram(s) IV Push once  dextrose 50% Injectable 25 Gram(s) IV Push once  dextrose 50% Injectable 25 Gram(s) IV Push once  heparin  Infusion.  Unit(s)/Hr IV Continuous <Continuous>  insulin glargine Injectable (LANTUS) 7 Unit(s) SubCutaneous at bedtime  insulin lispro (HumaLOG) corrective regimen sliding scale   SubCutaneous three times a day before meals  insulin lispro (HumaLOG) corrective regimen sliding scale   SubCutaneous at bedtime  levalbuterol Inhalation 0.63 milliGRAM(s) Inhalation every 8 hours  levothyroxine 75 MICROGram(s) Oral daily  predniSONE   Tablet 30 milliGRAM(s) Oral daily    PRN Inpatient Medications  acetaminophen   Tablet. 650 milliGRAM(s) Oral every 6 hours PRN  dextrose Gel 1 Dose(s) Oral once PRN  glucagon  Injectable 1 milliGRAM(s) IntraMuscular once PRN  heparin  Injectable 6000 Unit(s) IV Push every 6 hours PRN  heparin  Injectable 3000 Unit(s) IV Push every 6 hours PRN      REVIEW OF SYSTEMS  --------------------------------------------------------------------------------  Gen: no fatigue, fevers/chills, weakness  Skin: No rashes  Respiratory: + dyspnea  CV: No chest pain, PND, orthopnea  GI: No abdominal pain, diarrhea, constipation, nausea, vomiting  : No dysuria, hematuria  MSK: No joint pain/swelling; no edema  Neuro: no confusion    VITALS/PHYSICAL EXAM  --------------------------------------------------------------------------------  T(C): 36.6 (11-02-17 @ 11:44), Max: 37.4 (11-01-17 @ 21:18)  HR: 106 (11-02-17 @ 11:44) (55 - 106)  BP: 126/71 (11-02-17 @ 11:44) (126/71 - 144/64)  RR: 18 (11-02-17 @ 11:44) (18 - 20)  SpO2: 98% (11-02-17 @ 11:44) (96% - 100%)  Wt(kg): --  Height (cm): 162.56 (11-01-17 @ 14:40)  Weight (kg): 74.4 (11-01-17 @ 14:40)  BMI (kg/m2): 28.2 (11-01-17 @ 14:40)  BSA (m2): 1.8 (11-01-17 @ 14:40)      11-01-17 @ 07:01  -  11-02-17 @ 07:00  --------------------------------------------------------  IN: 473 mL / OUT: 2100 mL / NET: -1627 mL    11-02-17 @ 07:01  -  11-02-17 @ 13:56  --------------------------------------------------------  IN: 0 mL / OUT: 400 mL / NET: -400 mL      Physical Exam:  	Gen: NAD, well-appearing elderly  female, speaking full sentences  	HEENT: supple neck; moist oral mucosa  	Pulm: mild b/l basilar crackles  	CV: RRR, S1S2; no rub  	Back: No spinal or CVA tenderness; no sacral edema  	Abd: +BS, soft, nontender/nondistended  	: No suprapubic tenderness  	UE: Warm, FROM, no clubbing, intact strength; no edema  	LE: Warm, FROM, no clubbing, intact strength; no edema  	Neuro: No focal deficits  	Psych: Normal affect and mood  	Skin: Warm, without rashes    LABS/STUDIES  --------------------------------------------------------------------------------              10.7   10.59 >-----------<  328      [11-02-17 @ 06:45]              31.1     126  |  89  |  50  ----------------------------<  204      [11-02-17 @ 06:40]  5.0   |  19  |  3.06        Ca     8.7     [11-02-17 @ 06:40]      Mg     1.5     [11-02-17 @ 06:40]      Phos  4.4     [11-02-17 @ 06:40]    TPro  8.1  /  Alb  3.9  /  TBili  0.2  /  DBili  x   /  AST  31  /  ALT  20  /  AlkPhos  80  [10-31-17 @ 20:34]    PT/INR: PT 27.0 , INR 2.45       [10-31-17 @ 20:34]  PTT: 162.7      [11-02-17 @ 08:51]    Troponin 0.10      [11-01-17 @ 02:21]        [10-31-17 @ 20:34]  Serum Osmolality 292      [10-31-17 @ 23:30]    Creatinine Trend:  SCr 3.06 [11-02 @ 06:40]  SCr 2.47 [11-01 @ 02:21]  SCr 2.50 [10-31 @ 20:34]    Urinalysis - [10-31-17 @ 21:14]      Color Yellow / Appearance Clear / SG 1.013 / pH 6.0      Gluc Negative / Ketone Negative  / Bili Negative / Urobili Negative       Blood Small / Protein 150 / Leuk Est Negative / Nitrite Negative      RBC 10-25 / WBC 3-5 / Hyaline  / Gran  / Sq Epi  / Non Sq Epi Few / Bacteria     Urine Creatinine 16      [11-01-17 @ 04:31]  Urine Protein 67      [11-01-17 @ 04:31]  Urine Sodium 86      [10-31-17 @ 23:35]  Urine Osmolality 285      [10-31-17 @ 23:35]    HbA1c 5.6      [11-02-17 @ 06:45]  TSH 4.36      [11-01-17 @ 04:31]

## 2017-11-02 NOTE — PROGRESS NOTE ADULT - PROBLEM SELECTOR PLAN 1
Likely etiology is flash pulmonary edema in setting of hypertensive emergency (SBP of 200 on admission) vs asthma exacerbation  - Patient s/p BIPAP yesterday and overnight with significant improvement on VBG   - Patient currently satting well on room air

## 2017-11-02 NOTE — PROGRESS NOTE ADULT - PROBLEM SELECTOR PLAN 2
- likely 2/2 new onset heart failure  - c/w lasix 40mg IV q12h at this time  - f/u official ECHO; Cardio following

## 2017-11-02 NOTE — PROGRESS NOTE ADULT - PROBLEM SELECTOR PROBLEM 8
Type 2 diabetes mellitus without complication, unspecified long term insulin use status Hypothyroidism

## 2017-11-02 NOTE — PROGRESS NOTE ADULT - ATTENDING COMMENTS
85F with long standing hx of CKD (x 10 years), with unknown baseline Scr, DM, hx of recurrent UTIs and obstructive uropathy requiring stent placement and subsequent removal, with recently diagnosed Afib and HF and elevated Scr.    SAM on CKD: Exact duration and etiology of elevated Scr unknown, pt. however states that she is aware of CKD since last 10 years and etiology was thought to be recurrent UTIs and ?obstructive uropathy. Scr elevated on labs done today, in the setting of mild bilateral hydronephrosis and ?UTI, s/p martin catheter placement with good UO. Small kidney size c/w CKD. Monitor BMP. Strict I/Os. Avoid nephrotoxins. Renally dose all medications.  Pt's daughter-in-law to obtain records from pt's nephrologist in [place].    Microscopic Hematuria/Proteinuria : Rule out UTI/GN. Recommend to check urine cx and GN work up as above.    Hyponatremia : Asymptomatic, hypervolemic. Check urine osm and urine sodium. Agree with diuresis. Monitor serum sodium.    Anemia : Check free light chains, iron studies. Monitor CBC.

## 2017-11-02 NOTE — PROGRESS NOTE ADULT - PROBLEM SELECTOR PLAN 5
- SAM with Cr. 2.5 on admission with associated hyperkalemia; likely pre-renal in setting of poor LV function on bedside echo in ED. Unknown baseline. Cr now 3.06 on 11/2  - Renal U/S with b/l mild hydronephrosis  - UA with RBCs and no evidence of infection  - pending glomerulonephritis labs  - strict I&Os Patient with episode of atrial fibrillation today while going for Echo (11/2)  - Patient with newly diagnosed afib at outpatient cardiologist office and started on amiodarone, xarelto, and metoprolol    - as per cardiology recommendations, will discontinue xarelto and metoprolol  - c/w heparin gtt for anticoagulation  - cards/EP eval called for eval of trifasc block on admission with sinus bradycardia (1st degree AV block) and possible nonemergent PPM  - holding home amiodarone as per EP recommendations Patient with episode of atrial fibrillation today while going for Echo (11/2)  - Patient with newly diagnosed afib at outpatient cardiologist office and started on amiodarone, xarelto, and metoprolol    - as per cardiology recommendations, will discontinue xarelto and amiodarone  - c/w heparin gtt for anticoagulation  - cards/EP eval called for eval of trifasc block on admission with sinus bradycardia (1st degree AV block) and possible nonemergent PPM  - holding home amiodarone as per EP recommendations

## 2017-11-02 NOTE — PROGRESS NOTE ADULT - ASSESSMENT
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 Afib noted on 11/2.    -- check TTE  -- ischemia evaluation  -- continue tele  -- please obtain PMD/Cardiologist records documenting AFib (T [phone], Fax [phone])  -- hold amiodarone  -- retrial of coreg today  -- heparin gtt reasonable for AC in setting of afib    Hunter Blandon MD

## 2017-11-02 NOTE — PROGRESS NOTE ADULT - PROBLEM SELECTOR PLAN 3
, MAP>100 on admission with respiratory distress, now resolved  - continue to monitor BP; goal pressure in 48 hours after presentation is SBP of ~150  - c/w norvasc 10mg qD - SAM with Cr. 2.5 on admission with associated hyperkalemia; likely pre-renal in setting of poor LV function on bedside echo in ED. Unknown baseline. Cr now 3.06 on 11/2  - Renal U/S with b/l mild hydronephrosis  - UA with RBCs and no evidence of infection  - pending glomerulonephritis labs  - strict I&Os

## 2017-11-02 NOTE — PROGRESS NOTE ADULT - SUBJECTIVE AND OBJECTIVE BOX
CC: Acute HFpEF. New AF    Interval History: Patient has converted to AF confirming that she has it. Rates around 100 bpm. Echo demonstrates NL LV systolic function. Moderate diastolic dysfunction.     MEDICATIONS:  acetaminophen   Tablet. 650 milliGRAM(s) Oral every 6 hours PRN  amLODIPine   Tablet 10 milliGRAM(s) Oral daily  buDESOnide   0.5 milliGRAM(s) Respule 0.5 milliGRAM(s) Inhalation two times a day  carvedilol 3.125 milliGRAM(s) Oral every 12 hours  dextrose 5%. 1000 milliLiter(s) IV Continuous <Continuous>  dextrose 50% Injectable 12.5 Gram(s) IV Push once  dextrose 50% Injectable 25 Gram(s) IV Push once  dextrose 50% Injectable 25 Gram(s) IV Push once  dextrose Gel 1 Dose(s) Oral once PRN  glucagon  Injectable 1 milliGRAM(s) IntraMuscular once PRN  heparin  Infusion.  Unit(s)/Hr IV Continuous <Continuous>  heparin  Injectable 6000 Unit(s) IV Push every 6 hours PRN  heparin  Injectable 3000 Unit(s) IV Push every 6 hours PRN  insulin glargine Injectable (LANTUS) 7 Unit(s) SubCutaneous at bedtime  insulin lispro (HumaLOG) corrective regimen sliding scale   SubCutaneous three times a day before meals  insulin lispro (HumaLOG) corrective regimen sliding scale   SubCutaneous at bedtime  levalbuterol Inhalation 0.63 milliGRAM(s) Inhalation every 8 hours  levothyroxine 75 MICROGram(s) Oral daily  predniSONE   Tablet 30 milliGRAM(s) Oral daily  sodium chloride 0.9%. 1000 milliLiter(s) IV Continuous <Continuous>      LABS:  11-02    126<L>  |  89<L>  |  50<H>  ----------------------------<  204<H>  5.0   |  19<L>  |  3.06<H>    Ca    8.7      02 Nov 2017 06:40  Phos  4.4     11-02  Mg     1.5     11-02    TPro  8.1  /  Alb  3.9  /  TBili  0.2  /  DBili  x   /  AST  31  /  ALT  20  /  AlkPhos  80  10-31                          10.7   10.59 )-----------( 328      ( 02 Nov 2017 06:45 )             31.1     PT/INR - ( 31 Oct 2017 20:34 )   PT: 27.0 sec;   INR: 2.45 ratio         PTT - ( 02 Nov 2017 08:51 )  PTT:162.7 sec  CARDIAC MARKERS ( 01 Nov 2017 02:21 )  x     / 0.10 ng/mL / x     / x     / x      CARDIAC MARKERS ( 31 Oct 2017 20:34 )  x     / 0.12 ng/mL / 107 U/L / x     / 5.8 ng/mL      VITAL SIGNS:   T(C): 36.6 (11-02-17 @ 11:44), Max: 37.4 (11-01-17 @ 21:18)  HR: 106 (11-02-17 @ 11:44) (55 - 106)  BP: 126/71 (11-02-17 @ 11:44) (126/71 - 144/64)  RR: 18 (11-02-17 @ 11:44) (18 - 19)  SpO2: 98% (11-02-17 @ 11:44) (96% - 100%)  Daily     Daily   I&O's Summary    01 Nov 2017 07:01  -  02 Nov 2017 07:00  --------------------------------------------------------  IN: 473 mL / OUT: 2100 mL / NET: -1627 mL    02 Nov 2017 07:01  -  02 Nov 2017 15:11  --------------------------------------------------------  IN: 0 mL / OUT: 400 mL / NET: -400 mL        TELE: Converted to AF. Rates around 100.     ECHO: < from: Transthoracic Echocardiogram (11.02.17 @ 11:17) >  Conclusions:  1. Mitral annular calcification. Thickened mitral valve.  Mild mitral regurgitation.  Peak mitral valve gradient  equals 10 mm Hg, mean transmitral valve gradient equals 4  mm Hg, consistent with mild mitral stenosis.  2. Calcified trileaflet aortic valve with decreased  opening. Peak transaortic valve gradient equals 34 mm Hg,  mean transaortic valve gradient equals 20 mm Hg, estimated  aortic valve area equals 1.4 sqcm (by continuity equation),  aortic valve velocity time integral equals 59 cm,  consistent with moderate aortic stenosis. Mild aortic  regurgitation.  3. Mild concentric left ventricular hypertrophy.  4. Moderate diastolic dysfunction (Stage II).  5. Normal right ventricular size and function.  6. Thickened tricuspid valve.  *** No previous Echo exam.    < end of copied text >

## 2017-11-02 NOTE — PROGRESS NOTE ADULT - ASSESSMENT
85 year old female acute pulmonary edema in setting of elevated BP, likely acute HFpEF, new on set AF, SAM  ·	Echo shows normal systolic function with moderate diastolic dysfunction. Acute pulm edema likely related to diastolic dysfunction in setting of elevated BP and SAM. Resp status improved but crt increasing. Reasonable to hold off on diuretics. Strict I and O  ·	Patient has returned to AF. Continue with A/C. Hep gtt for now.   ·	Coreg started however since EF normal would consider changing to lopressor and will be easier to titrate for HR and BP control.   ·	Continue norvasc for now.   ·	Trop was positive likely type event in setting of low CrCl. Ischemic eval is still reasonable. Not candidate for cath given rising crt. Will consider pharm stress when overall improved.   ·	Hold off on amio.

## 2017-11-02 NOTE — PROGRESS NOTE ADULT - PROBLEM SELECTOR PLAN 2
Currently with no signs of edema or fluid overload  - initial pro-BNP = 38341r; initial bedside echo in ED with severely decreased LV systolic function  - Formal Echo (11/2) with EF>55, moderate diastolic dysfunction, moderate AS, mild MR, MS, and AR   - started patient on carvedilol 3.125mg PO BID  - Strict I&Os.  - Cardiology consulted, appreciate recs - switch from solumedrol 30g q8h to oral prednisone 30mg qD  - c/w Pulmicort 0.5mg BID  - pt switched from albuterol to xopinex 0.63 q8h because patient tachycardic in AM

## 2017-11-02 NOTE — PROVIDER CONTACT NOTE (CRITICAL VALUE NOTIFICATION) - ACTION/TREATMENT ORDERED:
follow nomogram. stop gtt for 60 minutes and decrease by 2ml/hr
Full AC heparin gtt nomogram followed, bleeding precautions continued. Will continue monitor patient.

## 2017-11-02 NOTE — PROGRESS NOTE ADULT - PROBLEM SELECTOR PLAN 1
- pt w/ likely SAM on CKD, with some obstructive component, as well as concern for cardio-renal component; will r/o GN   - sees a Nephrologist in [place], has known about kidney dx for the past 10 years, and thinks it is due to Hx of recurrent UTIs; has required ureteral stents in the past as well  - endorsed being on tamsulosin for issues w/ obstruction  - UA showing hematuria, proteinuria to 4.2g; could be from recurrent UTIs and recent stent but will r/o GN  - renal sono showing b/l mild hydro, and small kidneys (R 7.6cm, L7.5cm)   - ECHO showing newly depressed EF; pt w/ recent dx of A. fib     Reccs:  - BMP daily  - can try trial of void, and monitor w/ daily bladder sonos to ensure pt not retaining   - c/w pts tamsulosin daily  - f/u official ECHO; Cardio following   - f/u urine Cx   - f/u Bria, K, Cl, Cr, Urea  - f/u Hep panel, HIV, RPR, FADIA, C3, C4, ANCAs, anti-GBM, SPEP, UPEP, SIFE - pt w/ likely SAM on CKD, with some obstructive component, as well as concern for cardio-renal component; will r/o GN   - sees a Nephrologist in [place], has known about kidney dx for the past 10 years, and thinks it is due to Hx of recurrent UTIs; has required ureteral stents in the past as well  - endorsed being on tamsulosin for issues w/ obstruction  - UA showing hematuria, proteinuria to 4.2g; could be from recurrent UTIs and recent stent but will r/o GN  - renal sono showing b/l mild hydro, and small kidneys (R 7.6cm, L7.5cm)   - ECHO showing newly depressed EF; pt w/ recent dx of A. fib     Reccs:  - BMP daily  - can try trial of void, and monitor w/ daily bladder sonos to ensure pt not retaining   - c/w pts tamsulosin daily  - f/u official ECHO; Cardio following   - f/u urine Cx   - f/u Bria, K, Cl, Cr, Urea  - f/u Hep panel, HIV, RPR, FADIA, C3, C4, ANCAs, anti-GBM, SPEP, UPEP, SIFE, serum free light chains

## 2017-11-02 NOTE — PROGRESS NOTE ADULT - PROBLEM SELECTOR PLAN 8
- Hold off outpatient DM meds given concern for hypoglycemia.  - Monitor FST qAC and qHS. Coverage with [employer]SI for now.  - HBa1c = 5.6 - C/w outpatient dosage synthroid 75mcg daily.  - follow up TSH

## 2017-11-02 NOTE — PROGRESS NOTE ADULT - ASSESSMENT
86yo F w/ PMHx of DM, HTN, hypothyroidism, asthma, newly diagnosed afib 2 days ago BIBEMS yesterday for sob and n/v at home, found to have a potassium of 6.2 in the context of SAM, and hypoxic and hypercapnic respiratory distress likely flash pulm edema in setting of hypertensive emergency vs asthma exacerbation

## 2017-11-02 NOTE — PROGRESS NOTE ADULT - PROBLEM SELECTOR PLAN 3
- likely hypervolemic   - f/u repeat serum Osm, urine Osm, Bria (have labs done at same time)  - 1L fluid restriction  - IV lasix

## 2017-11-02 NOTE — PROGRESS NOTE ADULT - ATTENDING COMMENTS
No SOB, wheez, feels better today  Reviewed labs, imaging  -spoke to Cardiology, Renal. Doesn't seem volume overloaded, CXR clear  Given normal EF, moderate AS with Tachycardia, her Na 122, will give IVF NS 50cc/hr for 5 hrs, BMP in am  - BB with lopressor 12.5mg q 12 hrs, d/c Coreg, amiodarone, c/w IV heparin gtt (not a NOAC candidate)  - Prednisone 30mg daily, off IV steroid, bronchodilators for asthma  - d/c Arnol

## 2017-11-02 NOTE — PROGRESS NOTE ADULT - ATTENDING COMMENTS
seen and examined with fellow. I agree with H & P, A & P.  Proven to have PAF.  Echo with LVH and moderate diastolic dysfunction.  She would benefit from maintenance of sinus rhythm.  Antiarrhythmic medication options are limited by renal insufficiency and LVH.  Agree with resuming amiodarone.  Heparin to Coumadin. If she proves tachy-armand will consider for PPM.

## 2017-11-02 NOTE — PROGRESS NOTE ADULT - PROBLEM SELECTOR PLAN 6
Patient with episode of atrial fibrillation today while going for Echo (11/2)  - Patient with newly diagnosed afib at outpatient cardiologist office and started on amiodarone, xarelto, and metoprolol    - as per cardiology recommendations, will discontinue xarelto and metoprolol  - c/w heparin gtt for anticoagulation  - cards/EP eval called for eval of trifasc block on admission with sinus bradycardia (1st degree AV block) and possible nonemergent PPM  - holding home amiodarone as per EP recommendations Currently with no signs of edema or fluid overload  - initial pro-BNP = 44998k; initial bedside echo in ED with severely decreased LV systolic function  - Formal Echo (11/2) with EF>55, moderate diastolic dysfunction, moderate AS, mild MR, MS, and AR   - started patient on carvedilol 3.125mg PO BID  - Strict I&Os.  - Cardiology consulted, appreciate recs Currently with no signs of edema or fluid overload  - initial pro-BNP = 31294p; initial bedside echo in ED with severely decreased LV systolic function  - Formal Echo (11/2) with EF>55, moderate diastolic dysfunction, moderate AS, mild MR, MS, and AR   - switched patient from carvedilol 3.125mg PO BID to lopressor 12.5mg BID as per cardiology recommendations (medication is easier to titrate in setting of preserved ejection fraction)  - Strict I&Os.  - Cardiology consulted, appreciate recs

## 2017-11-02 NOTE — PROGRESS NOTE ADULT - PROBLEM SELECTOR PLAN 4
Sodium of 128 this AM (11/2) likely 2/2 to HF  - will give patient 1L NS 50ml/hr Sodium of 128 this AM (11/2) likely 2/2 to HF  - will give patient 250ccs @ 50ml/hr

## 2017-11-02 NOTE — PROGRESS NOTE ADULT - PROBLEM SELECTOR PLAN 9
- C/w outpatient dosage synthroid 75mcg daily.  - follow up TSH - pt heparin gtt  - diabetes diet - pt on heparin gtt  - diabetes diet

## 2017-11-02 NOTE — PROGRESS NOTE ADULT - SUBJECTIVE AND OBJECTIVE BOX
CC: sob + nausea/vomiting    [employer] Intern: Guillermo Narayanan  Spectralink: [id number]  Pager: [phone] CC: sob + nausea/vomiting    [employer] Intern: Guillermo Narayanan  Spectralink: [phone]  Pager: [phone]    HPI/INTERVAL HISTORY: Overnight, no acute events. This AM, while at echo, patient converted to atrial fibrillation which confirms that she has this (previously only seen at outpatient cardiologist office with no documentation.) Patient seen and examined at bedside this AM. Patient has no complaints and is sitting up comfortably eating breakfast. Patient denies chest pain, shortness of breath, diaphoresis, palpitations, nausea/vomiting, diarrhea/constipation.    OBJECTIVE:  VITAL SIGNS:  ICU Vital Signs Last 24 Hrs  T(C): 36.6 (2017 11:44), Max: 37.4 (2017 21:18)  T(F): 97.9 (:), Max: 99.4 (2017 21:18)  HR: 106 (:44) (55 - 106)  BP: 126/71 (:44) (126/71 - 144/64)  BP(mean): --  ABP: --  ABP(mean): --  RR: 18 (2017 11:44) (18 - 19)  SpO2: 98% (2017 11:44) (96% - 100%)         @ : @ 07:00  --------------------------------------------------------  IN: 473 mL / OUT: 2100 mL / NET: -1627 mL     @ 07: @ 15:33  --------------------------------------------------------  IN: 0 mL / OUT: 400 mL / NET: -400 mL      CAPILLARY BLOOD GLUCOSE  240 (2017 11:44)      POCT Blood Glucose.: 240 mg/dL (2017 11:46)      PHYSICAL EXAM:  Appearance: AOx3, NAD in bed eating breakfast  HEENT: MMM, PERRL, EOMI, NC/AT  Cardiovascular: normal S1 and S2, RRR, no m/r/g, no edema, normal JVP  Respiratory: Clear to auscultation bilaterally, no wheezes  Gastrointestinal: Soft, non-tender, non-distended, BS+  Musculoskeletal: No clubbing, no joint deformity   Neurologic: Non-focal  Lymphatic: No lymphadenopathy  Psychiatry: AAOx3, mood & affect appropriate  Skin: No rashes, no ecchymoses, no cyanosis    LABS:                        10.7   10.59 )-----------( 328      ( 2017 06:45 )             31.1     11-02    126<L>  |  89<L>  |  50<H>  ----------------------------<  204<H>  5.0   |  19<L>  |  3.06<H>    Ca    8.7      2017 06:40  Phos  4.4     11-  Mg     1.5         TPro  8.1  /  Alb  3.9  /  TBili  0.2  /  DBili  x   /  AST  31  /  ALT  20  /  AlkPhos  80  10-31    LIVER FUNCTIONS - ( 31 Oct 2017 20:34 )  Alb: 3.9 g/dL / Pro: 8.1 g/dL / ALK PHOS: 80 U/L / ALT: 20 U/L RC / AST: 31 U/L / GGT: x           PT/INR - ( 31 Oct 2017 20:34 )   PT: 27.0 sec;   INR: 2.45 ratio         PTT - ( 2017 08:51 )  PTT:162.7 sec  Urinalysis Basic - ( 31 Oct 2017 21:14 )    Color: Yellow / Appearance: Clear / S.013 / pH: x  Gluc: x / Ketone: Negative  / Bili: Negative / Urobili: Negative   Blood: x / Protein: 150 mg/dL / Nitrite: Negative   Leuk Esterase: Negative / RBC: 10-25 /HPF / WBC 3-5 /HPF   Sq Epi: x / Non Sq Epi: Few /HPF / Bacteria: x      CARDIAC MARKERS ( 2017 02:21 )  x     / 0.10 ng/mL / x     / x     / x      CARDIAC MARKERS ( 31 Oct 2017 20:34 )  x     / 0.12 ng/mL / 107 U/L / x     / 5.8 ng/mL        RADIOLOGY & ADDITIONAL TESTS: Reviewed.    acetaminophen   Tablet. 650 milliGRAM(s) Oral every 6 hours PRN  amLODIPine   Tablet 10 milliGRAM(s) Oral daily  buDESOnide   0.5 milliGRAM(s) Respule 0.5 milliGRAM(s) Inhalation two times a day  carvedilol 3.125 milliGRAM(s) Oral every 12 hours  dextrose 5%. 1000 milliLiter(s) IV Continuous <Continuous>  dextrose 50% Injectable 12.5 Gram(s) IV Push once  dextrose 50% Injectable 25 Gram(s) IV Push once  dextrose 50% Injectable 25 Gram(s) IV Push once  dextrose Gel 1 Dose(s) Oral once PRN  glucagon  Injectable 1 milliGRAM(s) IntraMuscular once PRN  heparin  Infusion.  Unit(s)/Hr IV Continuous <Continuous>  heparin  Injectable 6000 Unit(s) IV Push every 6 hours PRN  heparin  Injectable 3000 Unit(s) IV Push every 6 hours PRN  insulin glargine Injectable (LANTUS) 7 Unit(s) SubCutaneous at bedtime  insulin lispro (HumaLOG) corrective regimen sliding scale   SubCutaneous three times a day before meals  insulin lispro (HumaLOG) corrective regimen sliding scale   SubCutaneous at bedtime  levalbuterol Inhalation 0.63 milliGRAM(s) Inhalation every 8 hours  levothyroxine 75 MICROGram(s) Oral daily  predniSONE   Tablet 30 milliGRAM(s) Oral daily  sodium chloride 0.9%. 1000 milliLiter(s) IV Continuous <Continuous>      Iron 100 (Rash)

## 2017-11-02 NOTE — PROGRESS NOTE ADULT - PROBLEM SELECTOR PLAN 7
- switch from solumedrol 30g q8h to oral prednisone 30mg qD  - c/w Pulmicort 0.5mg BID  - pt switched from albuterol to xopinex 0.63 q8h because patient tachycardic in AM - Hold off outpatient DM meds given concern for hypoglycemia.  - Monitor FST qAC and qHS. Coverage with [employer] for now.  - HBa1c = 5.6 - Monitor FST qAC and qHS. Coverage with [employer] for now.  - HBa1c = 5.6

## 2017-11-03 PROBLEM — E11.9 TYPE 2 DIABETES MELLITUS WITHOUT COMPLICATIONS: Chronic | Status: ACTIVE | Noted: 2017-10-31

## 2017-11-03 PROBLEM — J45.909 UNSPECIFIED ASTHMA, UNCOMPLICATED: Chronic | Status: ACTIVE | Noted: 2017-10-31

## 2017-11-03 PROBLEM — I10 ESSENTIAL (PRIMARY) HYPERTENSION: Chronic | Status: ACTIVE | Noted: 2017-10-31

## 2017-11-03 PROBLEM — E03.9 HYPOTHYROIDISM, UNSPECIFIED: Chronic | Status: ACTIVE | Noted: 2017-10-31

## 2017-11-03 LAB
ANION GAP SERPL CALC-SCNC: 17 MMOL/L — SIGNIFICANT CHANGE UP (ref 5–17)
APTT BLD: 81 SEC — HIGH (ref 27.5–37.4)
BUN SERPL-MCNC: 52 MG/DL — HIGH (ref 7–23)
CALCIUM SERPL-MCNC: 8.5 MG/DL — SIGNIFICANT CHANGE UP (ref 8.4–10.5)
CHLORIDE SERPL-SCNC: 93 MMOL/L — LOW (ref 96–108)
CO2 SERPL-SCNC: 20 MMOL/L — LOW (ref 22–31)
CREAT SERPL-MCNC: 2.74 MG/DL — HIGH (ref 0.5–1.3)
GLUCOSE BLDC GLUCOMTR-MCNC: 105 MG/DL — HIGH (ref 70–99)
GLUCOSE BLDC GLUCOMTR-MCNC: 209 MG/DL — HIGH (ref 70–99)
GLUCOSE BLDC GLUCOMTR-MCNC: 213 MG/DL — HIGH (ref 70–99)
GLUCOSE BLDC GLUCOMTR-MCNC: 273 MG/DL — HIGH (ref 70–99)
GLUCOSE SERPL-MCNC: 108 MG/DL — HIGH (ref 70–99)
HCT VFR BLD CALC: 31.4 % — LOW (ref 34.5–45)
HGB BLD-MCNC: 10.6 G/DL — LOW (ref 11.5–15.5)
INR BLD: 1.13 RATIO — SIGNIFICANT CHANGE UP (ref 0.88–1.16)
MCHC RBC-ENTMCNC: 29 PG — SIGNIFICANT CHANGE UP (ref 27–34)
MCHC RBC-ENTMCNC: 33.8 GM/DL — SIGNIFICANT CHANGE UP (ref 32–36)
MCV RBC AUTO: 85.8 FL — SIGNIFICANT CHANGE UP (ref 80–100)
OSMOLALITY SERPL: 290 MOS/KG — SIGNIFICANT CHANGE UP (ref 275–300)
OSMOLALITY UR: 230 MOS/KG — SIGNIFICANT CHANGE UP (ref 50–1200)
PLATELET # BLD AUTO: 327 K/UL — SIGNIFICANT CHANGE UP (ref 150–400)
POTASSIUM SERPL-MCNC: 4.5 MMOL/L — SIGNIFICANT CHANGE UP (ref 3.5–5.3)
POTASSIUM SERPL-SCNC: 4.5 MMOL/L — SIGNIFICANT CHANGE UP (ref 3.5–5.3)
PROTHROM AB SERPL-ACNC: 12.8 SEC — SIGNIFICANT CHANGE UP (ref 10–13.1)
RBC # BLD: 3.66 M/UL — LOW (ref 3.8–5.2)
RBC # FLD: 13.3 % — SIGNIFICANT CHANGE UP (ref 10.3–14.5)
SODIUM SERPL-SCNC: 130 MMOL/L — LOW (ref 135–145)
SODIUM UR-SCNC: 34 MMOL/L — SIGNIFICANT CHANGE UP
WBC # BLD: 11.92 K/UL — HIGH (ref 3.8–10.5)
WBC # FLD AUTO: 11.92 K/UL — HIGH (ref 3.8–10.5)

## 2017-11-03 PROCEDURE — 99233 SBSQ HOSP IP/OBS HIGH 50: CPT | Mod: GC

## 2017-11-03 PROCEDURE — 99233 SBSQ HOSP IP/OBS HIGH 50: CPT

## 2017-11-03 PROCEDURE — 99232 SBSQ HOSP IP/OBS MODERATE 35: CPT

## 2017-11-03 RX ORDER — AMIODARONE HYDROCHLORIDE 400 MG/1
200 TABLET ORAL DAILY
Qty: 0 | Refills: 0 | Status: DISCONTINUED | OUTPATIENT
Start: 2017-11-03 | End: 2017-11-08

## 2017-11-03 RX ADMIN — Medication 12.5 MILLIGRAM(S): at 05:04

## 2017-11-03 RX ADMIN — LEVALBUTEROL 0.63 MILLIGRAM(S): 1.25 SOLUTION, CONCENTRATE RESPIRATORY (INHALATION) at 05:03

## 2017-11-03 RX ADMIN — AMLODIPINE BESYLATE 10 MILLIGRAM(S): 2.5 TABLET ORAL at 05:04

## 2017-11-03 RX ADMIN — Medication 3: at 17:05

## 2017-11-03 RX ADMIN — Medication 30 MILLIGRAM(S): at 05:03

## 2017-11-03 RX ADMIN — LEVALBUTEROL 0.63 MILLIGRAM(S): 1.25 SOLUTION, CONCENTRATE RESPIRATORY (INHALATION) at 13:35

## 2017-11-03 RX ADMIN — Medication 0.5 MILLIGRAM(S): at 05:03

## 2017-11-03 RX ADMIN — Medication 75 MICROGRAM(S): at 05:04

## 2017-11-03 RX ADMIN — Medication 2: at 12:27

## 2017-11-03 RX ADMIN — Medication 0.5 MILLIGRAM(S): at 17:15

## 2017-11-03 RX ADMIN — LEVALBUTEROL 0.63 MILLIGRAM(S): 1.25 SOLUTION, CONCENTRATE RESPIRATORY (INHALATION) at 21:47

## 2017-11-03 RX ADMIN — AMIODARONE HYDROCHLORIDE 400 MILLIGRAM(S): 400 TABLET ORAL at 05:04

## 2017-11-03 RX ADMIN — Medication 5 MILLIGRAM(S): at 21:48

## 2017-11-03 RX ADMIN — Medication 12.5 MILLIGRAM(S): at 17:05

## 2017-11-03 RX ADMIN — HEPARIN SODIUM 900 UNIT(S)/HR: 5000 INJECTION INTRAVENOUS; SUBCUTANEOUS at 01:00

## 2017-11-03 RX ADMIN — INSULIN GLARGINE 7 UNIT(S): 100 INJECTION, SOLUTION SUBCUTANEOUS at 21:48

## 2017-11-03 NOTE — PROGRESS NOTE ADULT - PROBLEM SELECTOR PLAN 6
Currently with no signs of edema or fluid overload  - initial pro-BNP = 65803k; initial bedside echo in ED with severely decreased LV systolic function  - Formal Echo (11/2) with EF>55, moderate diastolic dysfunction, moderate AS, mild MR, MS, and AR   - switched patient from carvedilol 3.125mg PO BID to lopressor 12.5mg BID as per cardiology recommendations (medication is easier to titrate in setting of preserved ejection fraction)  - Strict I&Os.  - Cardiology consulted, appreciate recs Currently with no signs of edema or fluid overload  - initial pro-BNP = 59407r; initial bedside echo in ED with severely decreased LV systolic function  - Formal Echo (11/2) with EF>55, moderate diastolic dysfunction, moderate AS, mild MR, MS, and AR   - c/w lopressor 12.5mg BID as per cardiology recommendations  - Strict I&Os  - Cardiology consulted, appreciate recs

## 2017-11-03 NOTE — PROGRESS NOTE ADULT - PROBLEM SELECTOR PLAN 2
- switch from solumedrol 30g q8h to oral prednisone 30mg qD  - c/w Pulmicort 0.5mg BID  - pt switched from albuterol to xopinex 0.63 q8h because patient tachycardic in AM - c/w oral prednisone 30mg qD  - c/w Pulmicort 0.5mg BID  - c/w xopinex 0.63 q8h

## 2017-11-03 NOTE — PROGRESS NOTE ADULT - ASSESSMENT
85 year old female acute pulmonary edema in setting of elevated BP, likely acute HFpEF, new on set AF, SAM  ·	Discussed with Dr. Herron. Will restart amio to try to maintain NSR given HFpEF. Continue low dose beta blocker as well. Will monitor closely for pauses, HB. If present will need PPM  ·	Holding diuretics. Crt slightly better. Strict I and O  ·	Hep gtt for A/C. If no indication for procedure will start coumadin.   ·	D/w Dr. Chavira.

## 2017-11-03 NOTE — PROGRESS NOTE ADULT - SUBJECTIVE AND OBJECTIVE BOX
CC:    Interval History:     MEDICATIONS:  acetaminophen   Tablet. 650 milliGRAM(s) Oral every 6 hours PRN  amiodarone    Tablet 200 milliGRAM(s) Oral daily  amLODIPine   Tablet 10 milliGRAM(s) Oral daily  bisacodyl 5 milliGRAM(s) Oral at bedtime  buDESOnide   0.5 milliGRAM(s) Respule 0.5 milliGRAM(s) Inhalation two times a day  dextrose 5%. 1000 milliLiter(s) IV Continuous <Continuous>  dextrose 50% Injectable 12.5 Gram(s) IV Push once  dextrose 50% Injectable 25 Gram(s) IV Push once  dextrose 50% Injectable 25 Gram(s) IV Push once  dextrose Gel 1 Dose(s) Oral once PRN  glucagon  Injectable 1 milliGRAM(s) IntraMuscular once PRN  heparin  Infusion.  Unit(s)/Hr IV Continuous <Continuous>  heparin  Injectable 6000 Unit(s) IV Push every 6 hours PRN  heparin  Injectable 3000 Unit(s) IV Push every 6 hours PRN  insulin glargine Injectable (LANTUS) 7 Unit(s) SubCutaneous at bedtime  insulin lispro (HumaLOG) corrective regimen sliding scale   SubCutaneous three times a day before meals  insulin lispro (HumaLOG) corrective regimen sliding scale   SubCutaneous at bedtime  levalbuterol Inhalation 0.63 milliGRAM(s) Inhalation every 8 hours  levothyroxine 75 MICROGram(s) Oral daily  metoprolol     tartrate 12.5 milliGRAM(s) Oral two times a day  sodium chloride 0.9%. 1000 milliLiter(s) IV Continuous <Continuous>      LABS:  11-03    130<L>  |  93<L>  |  52<H>  ----------------------------<  108<H>  4.5   |  20<L>  |  2.74<H>    Ca    8.5      03 Nov 2017 07:52  Phos  4.4     11-02  Mg     1.5     11-02                            10.6   11.92 )-----------( 327      ( 03 Nov 2017 08:38 )             31.4     PT/INR - ( 03 Nov 2017 08:55 )   PT: 12.8 sec;   INR: 1.13 ratio         PTT - ( 03 Nov 2017 00:47 )  PTT:81.0 sec          VITAL SIGNS:   T(C): 36.6 (11-03-17 @ 12:12), Max: 36.7 (11-02-17 @ 21:22)  HR: 85 (11-03-17 @ 12:12) (68 - 111)  BP: 135/84 (11-03-17 @ 12:12) (112/72 - 135/84)  RR: 18 (11-03-17 @ 12:12) (18 - 18)  SpO2: 98% (11-03-17 @ 12:12) (98% - 99%)  Daily     Daily   I&O's Summary    02 Nov 2017 07:01  -  03 Nov 2017 07:00  --------------------------------------------------------  IN: 829 mL / OUT: 1600 mL / NET: -771 mL    03 Nov 2017 07:01  -  03 Nov 2017 15:50  --------------------------------------------------------  IN: 600 mL / OUT: 400 mL / NET: 200 mL        TELE: CC: SOB, AF    Interval History: No significant cardiac events overnight.     MEDICATIONS:  acetaminophen   Tablet. 650 milliGRAM(s) Oral every 6 hours PRN  amiodarone    Tablet 200 milliGRAM(s) Oral daily  amLODIPine   Tablet 10 milliGRAM(s) Oral daily  bisacodyl 5 milliGRAM(s) Oral at bedtime  buDESOnide   0.5 milliGRAM(s) Respule 0.5 milliGRAM(s) Inhalation two times a day  dextrose 5%. 1000 milliLiter(s) IV Continuous <Continuous>  dextrose 50% Injectable 12.5 Gram(s) IV Push once  dextrose 50% Injectable 25 Gram(s) IV Push once  dextrose 50% Injectable 25 Gram(s) IV Push once  dextrose Gel 1 Dose(s) Oral once PRN  glucagon  Injectable 1 milliGRAM(s) IntraMuscular once PRN  heparin  Infusion.  Unit(s)/Hr IV Continuous <Continuous>  heparin  Injectable 6000 Unit(s) IV Push every 6 hours PRN  heparin  Injectable 3000 Unit(s) IV Push every 6 hours PRN  insulin glargine Injectable (LANTUS) 7 Unit(s) SubCutaneous at bedtime  insulin lispro (HumaLOG) corrective regimen sliding scale   SubCutaneous three times a day before meals  insulin lispro (HumaLOG) corrective regimen sliding scale   SubCutaneous at bedtime  levalbuterol Inhalation 0.63 milliGRAM(s) Inhalation every 8 hours  levothyroxine 75 MICROGram(s) Oral daily  metoprolol     tartrate 12.5 milliGRAM(s) Oral two times a day  sodium chloride 0.9%. 1000 milliLiter(s) IV Continuous <Continuous>      LABS:  11-03    130<L>  |  93<L>  |  52<H>  ----------------------------<  108<H>  4.5   |  20<L>  |  2.74<H>    Ca    8.5      03 Nov 2017 07:52  Phos  4.4     11-02  Mg     1.5     11-02                            10.6   11.92 )-----------( 327      ( 03 Nov 2017 08:38 )             31.4     PT/INR - ( 03 Nov 2017 08:55 )   PT: 12.8 sec;   INR: 1.13 ratio         PTT - ( 03 Nov 2017 00:47 )  PTT:81.0 sec          VITAL SIGNS:   T(C): 36.6 (11-03-17 @ 12:12), Max: 36.7 (11-02-17 @ 21:22)  HR: 85 (11-03-17 @ 12:12) (68 - 111)  BP: 135/84 (11-03-17 @ 12:12) (112/72 - 135/84)  RR: 18 (11-03-17 @ 12:12) (18 - 18)  SpO2: 98% (11-03-17 @ 12:12) (98% - 99%)  Daily     Daily   I&O's Summary    02 Nov 2017 07:01  -  03 Nov 2017 07:00  --------------------------------------------------------  IN: 829 mL / OUT: 1600 mL / NET: -771 mL    03 Nov 2017 07:01  -  03 Nov 2017 15:50  --------------------------------------------------------  IN: 600 mL / OUT: 400 mL / NET: 200 mL        TELE: AF

## 2017-11-03 NOTE — PROGRESS NOTE ADULT - SUBJECTIVE AND OBJECTIVE BOX
Interval events:  Remains in afib 90-120s  No conversion, no pauses  Patient ambulatory and good PO intake    Review Of Systems:  Constitutional: [ ] Fever [ ] Chills [ ] Fatigue [ ] Weight change   HEENT: [ ] Blurred vision [ ] Eye Pain [ ] Headache [ ] Runny nose [ ] Sore Throat   Respiratory: [ ] Cough [ ] Wheezing [ ] Shortness of breath  Cardiovascular: [ ] Chest Pain [ ] Palpitations [ ] POZO [ ] PND [ ] Orthopnea  Gastrointestinal: [ ] Abdominal Pain [ ] Diarrhea [ ] Constipation [ ] Hemorrhoids [ ] Nausea [ ] Vomiting  Genitourinary: [ ] Nocturia [ ] Dysuria [ ] Incontinence  Extremities: [ ] Swelling [ ] Joint Pain  Neurologic: [ ] Focal deficit [ ] Paresthesias [ ] Syncope  Lymphatic: [ ] Swelling [ ] Lymphadenopathy   Skin: [ ] Rash [ ] Ecchymoses [ ] Wounds [ ] Lesions  Psychiatry: [ ] Depression [ ] Suicidal/Homicidal Ideation [ ] Anxiety [ ] Sleep Disturbances  [ ] 10 point review of systems is otherwise negative except as mentioned above            [ ]Unable to obtain    Medications:  acetaminophen   Tablet. 650 milliGRAM(s) Oral every 6 hours PRN  amiodarone    Tablet 400 milliGRAM(s) Oral daily  amLODIPine   Tablet 10 milliGRAM(s) Oral daily  bisacodyl 5 milliGRAM(s) Oral at bedtime  buDESOnide   0.5 milliGRAM(s) Respule 0.5 milliGRAM(s) Inhalation two times a day  dextrose 5%. 1000 milliLiter(s) IV Continuous <Continuous>  dextrose 50% Injectable 12.5 Gram(s) IV Push once  dextrose 50% Injectable 25 Gram(s) IV Push once  dextrose 50% Injectable 25 Gram(s) IV Push once  dextrose Gel 1 Dose(s) Oral once PRN  glucagon  Injectable 1 milliGRAM(s) IntraMuscular once PRN  heparin  Infusion.  Unit(s)/Hr IV Continuous <Continuous>  heparin  Injectable 6000 Unit(s) IV Push every 6 hours PRN  heparin  Injectable 3000 Unit(s) IV Push every 6 hours PRN  insulin glargine Injectable (LANTUS) 7 Unit(s) SubCutaneous at bedtime  insulin lispro (HumaLOG) corrective regimen sliding scale   SubCutaneous three times a day before meals  insulin lispro (HumaLOG) corrective regimen sliding scale   SubCutaneous at bedtime  levalbuterol Inhalation 0.63 milliGRAM(s) Inhalation every 8 hours  levothyroxine 75 MICROGram(s) Oral daily  metoprolol     tartrate 12.5 milliGRAM(s) Oral two times a day  predniSONE   Tablet 30 milliGRAM(s) Oral daily  sodium chloride 0.9%. 1000 milliLiter(s) IV Continuous <Continuous>    PMH/PSH/FH/SH: [ ] Unchanged  Vitals:  T(C): 36.4 (11-03-17 @ 04:29), Max: 36.7 (11-02-17 @ 21:22)  HR: 80 (11-03-17 @ 04:29) (68 - 111)  BP: 125/81 (11-03-17 @ 04:29) (112/72 - 135/66)  BP(mean): --  RR: 18 (11-03-17 @ 04:29) (18 - 18)  SpO2: 98% (11-03-17 @ 04:29) (96% - 99%)  Wt(kg): --  Daily     Daily   I&O's Summary    02 Nov 2017 07:01  -  03 Nov 2017 07:00  --------------------------------------------------------  IN: 829 mL / OUT: 1600 mL / NET: -771 mL    03 Nov 2017 07:01  -  03 Nov 2017 08:59  --------------------------------------------------------  IN: 0 mL / OUT: 400 mL / NET: -400 mL        Physical Exam:  Appearance:  Normal, NAD  Eyes: PERRL, EOMI  HENT: Normal oral [place] NC/AT  Cardiovascular: S1, S2, irr irr, No m/r/g appreciated, No edema, no elevation in JVP  Respiratory: Clear to auscultation bilaterally  Gastrointestinal: Soft, Non-tender, Non-distended, BS+  Musculoskeletal:  No clubbing, No joint deformity   Neurologic: Non-focal  Lymphatic: No lymphadenopathy  Psychiatry: AAOx3, Mood & affect appropriate  Skin: No rashes, No ecchymoses, No cyanosis    Labs:                        10.7   10.59 )-----------( 328      ( 02 Nov 2017 06:45 )             31.1     11-03    130<L>  |  93<L>  |  52<H>  ----------------------------<  108<H>  4.5   |  20<L>  |  2.74<H>    Ca    8.5      03 Nov 2017 07:52  Phos  4.4     11-02  Mg     1.5     11-02      PTT - ( 03 Nov 2017 00:47 )  PTT:81.0 sec      Serum Pro-Brain Natriuretic Peptide: 95230 pg/mL (10-31 @ 20:34)    TTE  EF 59%  Conclusions:  1. Mitral annular calcification. Thickened mitral valve.  Mild mitral regurgitation.  Peak mitral valve gradient  equals 10 mm Hg, mean transmitral valve gradient equals 4  mm Hg, consistent with mild mitral stenosis.  2. Calcified trileaflet aortic valve with decreased  opening. Peak transaortic valve gradient equals 34 mm Hg,  mean transaortic valve gradient equals 20 mm Hg, estimated  aortic valve area equals 1.4 sqcm (by continuity equation),  aortic valve velocity time integral equals 59 cm,  consistent with moderate aortic stenosis. Mild aortic  regurgitation.  3. Mild concentric left ventricular hypertrophy.  4. Moderate diastolic dysfunction (Stage II).  5. Normal right ventricular size and function.  6. Thickened tricuspid valve.    Interpretation of Telemetry: afib 80-120s, no pauses, no conversion to sinus

## 2017-11-03 NOTE — PROGRESS NOTE ADULT - ATTENDING COMMENTS
85F with long standing hx of CKD (x 10 years), with unknown baseline Scr (Scr 2 in Jan, 2017 and sees nephrologist in [place]), DM, hx of recurrent UTIs and obstructive uropathy requiring stent placement and subsequent removal, with recently diagnosed Afib and HF and elevated Scr.    SAM on CKD: Exact duration and etiology of elevated Scr unknown, pt. however states that she is aware of CKD since last 10 years and etiology was thought to be recurrent UTIs, DM and ?obstructive uropathy. Pt. with, in the setting of mild bilateral hydronephrosis and ?UTI, diuretic use, s/p martin catheter placement with good UO. Small kidney size c/w CKD. Scr improved with IVF. Agree with holding lasix. Monitor BMP. Strict I/Os. Avoid nephrotoxins. Renally dose all medications.    Microscopic Hematuria/Proteinuria : Rule out UTI/GN. Recommend to check urine cx and GN work up as above.    Hyponatremia : Asymptomatic, urine sodium consistent with volume depletion, improved with IV saline. Monitor urine osm and urine sodium. Recommend fluid restriction. Monitor serum sodium.    Anemia : Check free light chains, iron studies. Monitor CBC.

## 2017-11-03 NOTE — PROGRESS NOTE ADULT - ASSESSMENT
84yo F w/ PMHx of DM, HTN, hypothyroidism, asthma, newly diagnosed afib 2 days ago BIBEMS yesterday for sob and n/v at home, found to have a potassium of 6.2 in the context of SAM, and hypoxic and hypercapnic respiratory distress likely flash pulm edema in setting of hypertensive emergency vs asthma exacerbation

## 2017-11-03 NOTE — PROGRESS NOTE ADULT - PROBLEM SELECTOR PLAN 1
- pt w/ likely SAM on CKD, with some obstructive component, as well as concern for cardio-renal component; will r/o GN   - CKD possibly 2/2 recurrent UTIs and DM  - UA showing hematuria, proteinuria to 4.2g; could be from recurrent UTIs and recent stent; but will r/o GN  - renal sono showing b/l mild hydro, and small kidneys (R 7.6cm, L7.5cm)   - ECHO w/ mild diastolic dysfn.  Reccs:  - BMP daily  - hold off IVF; hold diuresis for today   - monitor urine output  - tamsulosin daily  - f/u urine Cx   - f/u Hep panel, HIV, RPR, FADIA, C3, C4, ANCAs, anti-GBM, SPEP, UPEP, SIFE, serum free light chains  - daughter to bring in outpt records from [place]

## 2017-11-03 NOTE — PROGRESS NOTE ADULT - PROBLEM SELECTOR PLAN 5
Patient with episode of atrial fibrillation today while going for Echo (11/2)  - Patient with newly diagnosed afib at outpatient cardiologist office and started on amiodarone, xarelto, and metoprolol    - as per cardiology recommendations, will discontinue xarelto and amiodarone  - c/w heparin gtt for anticoagulation  - cards/EP eval called for eval of trifasc block on admission with sinus bradycardia (1st degree AV block) and possible nonemergent PPM  - holding home amiodarone as per EP recommendations Patient with episode of atrial fibrillation today while going for Echo (11/3)  - Patient with newly diagnosed afib at outpatient cardiologist office and started on amiodarone, xarelto, and metoprolol    - as per cardiology recommendations, will discontinue xarelto  - will start amiodarone 400mg today qD  - c/w heparin gtt for anticoagulation  - cards/EP eval called for eval of trifasc block on admission with sinus bradycardia (1st degree AV block) and possible nonemergent PPM  - holding home amiodarone as per EP recommendations

## 2017-11-03 NOTE — PROGRESS NOTE ADULT - SUBJECTIVE AND OBJECTIVE BOX
[facility] DIVISION OF KIDNEY DISEASES AND HYPERTENSION -- FOLLOW UP NOTE  --------------------------------------------------------------------------------  Chief Complaint: SAM, CKD    24 hour events/subjective: Feeling well. No SOB. No edema.        PAST HISTORY  --------------------------------------------------------------------------------  No significant changes to PMH, PSH, FHx, SHx, unless otherwise noted    ALLERGIES & MEDICATIONS  --------------------------------------------------------------------------------  Allergies    Iron 100 (Rash)    Intolerances      Standing Inpatient Medications  amiodarone    Tablet 400 milliGRAM(s) Oral daily  amLODIPine   Tablet 10 milliGRAM(s) Oral daily  bisacodyl 5 milliGRAM(s) Oral at bedtime  buDESOnide   0.5 milliGRAM(s) Respule 0.5 milliGRAM(s) Inhalation two times a day  dextrose 5%. 1000 milliLiter(s) IV Continuous <Continuous>  dextrose 50% Injectable 12.5 Gram(s) IV Push once  dextrose 50% Injectable 25 Gram(s) IV Push once  dextrose 50% Injectable 25 Gram(s) IV Push once  heparin  Infusion.  Unit(s)/Hr IV Continuous <Continuous>  insulin glargine Injectable (LANTUS) 7 Unit(s) SubCutaneous at bedtime  insulin lispro (HumaLOG) corrective regimen sliding scale   SubCutaneous three times a day before meals  insulin lispro (HumaLOG) corrective regimen sliding scale   SubCutaneous at bedtime  levalbuterol Inhalation 0.63 milliGRAM(s) Inhalation every 8 hours  levothyroxine 75 MICROGram(s) Oral daily  metoprolol     tartrate 12.5 milliGRAM(s) Oral two times a day  sodium chloride 0.9%. 1000 milliLiter(s) IV Continuous <Continuous>    PRN Inpatient Medications  acetaminophen   Tablet. 650 milliGRAM(s) Oral every 6 hours PRN  dextrose Gel 1 Dose(s) Oral once PRN  glucagon  Injectable 1 milliGRAM(s) IntraMuscular once PRN  heparin  Injectable 6000 Unit(s) IV Push every 6 hours PRN  heparin  Injectable 3000 Unit(s) IV Push every 6 hours PRN      REVIEW OF SYSTEMS  --------------------------------------------------------------------------------  Gen: no fatigue, fevers/chills, weakness  Skin: No rashes  Respiratory: No dyspnea, cough, wheezing, hemoptysis  CV: No chest pain, PND, orthopnea  GI: No abdominal pain, diarrhea, constipation, nausea, vomiting  : No dysuria, hematuria  MSK: No joint pain/swelling; no edema  Neuro: no confusion    VITALS/PHYSICAL EXAM  --------------------------------------------------------------------------------  T(C): 36.4 (11-03-17 @ 04:29), Max: 36.7 (11-02-17 @ 21:22)  HR: 80 (11-03-17 @ 04:29) (68 - 111)  BP: 125/81 (11-03-17 @ 04:29) (112/72 - 135/66)  RR: 18 (11-03-17 @ 04:29) (18 - 18)  SpO2: 98% (11-03-17 @ 04:29) (98% - 99%)  Wt(kg): --  Height (cm): 162.56 (11-01-17 @ 14:40)  Weight (kg): 74.4 (11-01-17 @ 14:40)  BMI (kg/m2): 28.2 (11-01-17 @ 14:40)  BSA (m2): 1.8 (11-01-17 @ 14:40)      11-02-17 @ 07:01  -  11-03-17 @ 07:00  --------------------------------------------------------  IN: 829 mL / OUT: 1600 mL / NET: -771 mL    11-03-17 @ 07:01  -  11-03-17 @ 12:01  --------------------------------------------------------  IN: 240 mL / OUT: 400 mL / NET: -160 mL      Physical Exam:  	Gen: NAD, well-appearing elderly [place]ian female, speaking full sentences  	HEENT: supple neck; moist oral mucosa  	Pulm: b/l cta  	CV: RRR, S1S2; no rub  	Back: No spinal or CVA tenderness; no sacral edema  	Abd: +BS, soft, nontender/nondistended  	: No suprapubic tenderness  	UE: Warm, FROM, no clubbing, intact strength; no edema  	LE: Warm, FROM, no clubbing, intact strength; no edema  	Neuro: No focal deficits  	Psych: Normal affect and mood  	Skin: Warm, without rashes    LABS/STUDIES  --------------------------------------------------------------------------------              10.6   11.92 >-----------<  327      [11-03-17 @ 08:38]              31.4     130  |  93  |  52  ----------------------------<  108      [11-03-17 @ 07:52]  4.5   |  20  |  2.74        Ca     8.5     [11-03-17 @ 07:52]      Mg     1.5     [11-02-17 @ 06:40]      Phos  4.4     [11-02-17 @ 06:40]      PT/INR: PT 12.8 , INR 1.13       [11-03-17 @ 08:55]  PTT: 81.0       [11-03-17 @ 00:47]    Serum Osmolality 292      [11-02-17 @ 17:15]    Creatinine Trend:  SCr 2.74 [11-03 @ 07:52]  SCr 3.06 [11-02 @ 06:40]  SCr 2.47 [11-01 @ 02:21]  SCr 2.50 [10-31 @ 20:34]    Urinalysis - [10-31-17 @ 21:14]      Color Yellow / Appearance Clear / SG 1.013 / pH 6.0      Gluc Negative / Ketone Negative  / Bili Negative / Urobili Negative       Blood Small / Protein 150 / Leuk Est Negative / Nitrite Negative      RBC 10-25 / WBC 3-5 / Hyaline  / Gran  / Sq Epi  / Non Sq Epi Few / Bacteria     Urine Creatinine 16      [11-01-17 @ 04:31]  Urine Protein 67      [11-01-17 @ 04:31]  Urine Sodium 34      [11-03-17 @ 08:54]  Urine Osmolality 274      [11-02-17 @ 18:03]    HbA1c 5.6      [11-02-17 @ 06:45]  TSH 4.36      [11-01-17 @ 04:31]

## 2017-11-03 NOTE — PROGRESS NOTE ADULT - ASSESSMENT
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 Patient is remaining in AFib.    -- ischemia evaluation  -- continue tele, monitoring for pauses  -- continue amiodarone 400 mg daily  -- continue metoprolol 12.5 mg BID  -- heparin gtt reasonable for AC in setting of afib    Hunter Blandon MD

## 2017-11-03 NOTE — PROGRESS NOTE ADULT - PROBLEM SELECTOR PLAN 3
- SAM with Cr. 2.5 on admission with associated hyperkalemia; likely pre-renal in setting of poor LV function on bedside echo in ED. Unknown baseline. Cr now 3.06 on 11/2  - Renal U/S with b/l mild hydronephrosis  - UA with RBCs and no evidence of infection  - pending glomerulonephritis labs  - strict I&Os - SAM with Cr. 2.5 on admission with associated hyperkalemia; likely pre-renal in setting of poor LV function on bedside echo in ED. Unknown baseline. Cr now 2.7 on 11/3  - s/p 250ml NS yesterday  - Renal U/S with b/l mild hydronephrosis  - UA with RBCs and no evidence of infection  - pending glomerulonephritis labs  - strict I&Os

## 2017-11-03 NOTE — PROGRESS NOTE ADULT - PROBLEM SELECTOR PLAN 1
Likely etiology is flash pulmonary edema in setting of hypertensive emergency (SBP of 200 on admission) vs asthma exacerbation  - Patient s/p BIPAP yesterday and overnight with significant improvement on VBG   - Patient currently satting well on room air Likely etiology is flash pulmonary edema in setting of hypertensive emergency (SBP of 200 on admission) vs asthma exacerbation  - Patient currently satting well on room air

## 2017-11-03 NOTE — PROGRESS NOTE ADULT - SUBJECTIVE AND OBJECTIVE BOX
CC: sob + nausea/vomiting    [employer] Intern: Guillermo Narayanan  Spectralink: [id number]  Pager: [phone] CC: sob + nausea/vomiting    [employer] Intern: Guillermo Narayanan  Spectralink: [id number]  Pager: [phone]    HPI/INTERVAL HISTORY: Overnight, no acute vents. Patient seen and examined at bedside this AM. Patient sitting up in bed eating comfortably. Patient denies nausea/vomiting, fevers/chills, chest pain, palpitations, diaphoresis, shortness of breath, diarrhea/constipation, urinary incontinence.    OBJECTIVE:  VITAL SIGNS:  ICU Vital Signs Last 24 Hrs  T(C): 36.4 (03 Nov 2017 04:29), Max: 36.7 (02 Nov 2017 21:22)  T(F): 97.5 (03 Nov 2017 04:29), Max: 98 (02 Nov 2017 21:22)  HR: 80 (03 Nov 2017 04:29) (68 - 111)  BP: 125/81 (03 Nov 2017 04:29) (112/72 - 135/66)  BP(mean): --  ABP: --  ABP(mean): --  RR: 18 (03 Nov 2017 04:29) (18 - 18)  SpO2: 98% (03 Nov 2017 04:29) (98% - 99%)        11-02 @ 07:01 - 11-03 @ 07:00  --------------------------------------------------------  IN: 829 mL / OUT: 1600 mL / NET: -771 mL    11-03 @ 07:01 - 11-03 @ 10:15  --------------------------------------------------------  IN: 0 mL / OUT: 400 mL / NET: -400 mL      CAPILLARY BLOOD GLUCOSE  240 (02 Nov 2017 11:44)      POCT Blood Glucose.: 105 mg/dL (03 Nov 2017 07:54)      PHYSICAL EXAM:  Appearance: AOx3, NAD in bed eating breakfast  HEENT: MMM, PERRL, EOMI, NC/AT  Cardiovascular: normal S1 and S2, RRR, no m/r/g, no edema, normal JVP  Respiratory: Clear to auscultation bilaterally, no wheezes  Gastrointestinal: Soft, non-tender, non-distended, BS+  Musculoskeletal: No clubbing, no joint deformity   Neurologic: Non-focal, CN II-XII intact, 5/5 UE strength, 5/5 LE strength  Lymphatic: No lymphadenopathy  Psychiatry: AAOx3, mood & affect appropriate  Skin: No rashes, no ecchymoses, no cyanosis    LABS:                        10.6   11.92 )-----------( 327      ( 03 Nov 2017 08:38 )             31.4     11-03    130<L>  |  93<L>  |  52<H>  ----------------------------<  108<H>  4.5   |  20<L>  |  2.74<H>    Ca    8.5      03 Nov 2017 07:52  Phos  4.4     11-02  Mg     1.5     11-02        PT/INR - ( 03 Nov 2017 08:55 )   PT: 12.8 sec;   INR: 1.13 ratio         PTT - ( 03 Nov 2017 00:47 )  PTT:81.0 sec          RADIOLOGY & ADDITIONAL TESTS: Reviewed.    acetaminophen   Tablet. 650 milliGRAM(s) Oral every 6 hours PRN  amiodarone    Tablet 400 milliGRAM(s) Oral daily  amLODIPine   Tablet 10 milliGRAM(s) Oral daily  bisacodyl 5 milliGRAM(s) Oral at bedtime  buDESOnide   0.5 milliGRAM(s) Respule 0.5 milliGRAM(s) Inhalation two times a day  dextrose 5%. 1000 milliLiter(s) IV Continuous <Continuous>  dextrose 50% Injectable 12.5 Gram(s) IV Push once  dextrose 50% Injectable 25 Gram(s) IV Push once  dextrose 50% Injectable 25 Gram(s) IV Push once  dextrose Gel 1 Dose(s) Oral once PRN  glucagon  Injectable 1 milliGRAM(s) IntraMuscular once PRN  heparin  Infusion.  Unit(s)/Hr IV Continuous <Continuous>  heparin  Injectable 6000 Unit(s) IV Push every 6 hours PRN  heparin  Injectable 3000 Unit(s) IV Push every 6 hours PRN  insulin glargine Injectable (LANTUS) 7 Unit(s) SubCutaneous at bedtime  insulin lispro (HumaLOG) corrective regimen sliding scale   SubCutaneous three times a day before meals  insulin lispro (HumaLOG) corrective regimen sliding scale   SubCutaneous at bedtime  levalbuterol Inhalation 0.63 milliGRAM(s) Inhalation every 8 hours  levothyroxine 75 MICROGram(s) Oral daily  metoprolol     tartrate 12.5 milliGRAM(s) Oral two times a day  predniSONE   Tablet 30 milliGRAM(s) Oral daily  sodium chloride 0.9%. 1000 milliLiter(s) IV Continuous <Continuous>      Iron 100 (Rash)

## 2017-11-03 NOTE — PROGRESS NOTE ADULT - PROBLEM SELECTOR PLAN 2
- likely 2/2 new onset heart failure  - appears euvolemic at this time  - ECHO showing diastolic dysfn

## 2017-11-03 NOTE — PROVIDER CONTACT NOTE (OTHER) - SITUATION
bladder scan results: 61 mL. Pt s/p Ambrose 11/2/17 and voiding yesterday. Today pt c/o of difficulty urinating.

## 2017-11-03 NOTE — PROGRESS NOTE ADULT - ATTENDING COMMENTS
seen and examined with fellow. I agree with H & P, A & P.  R/S amio as would ideally want to maintain sinus in the setting of diastolic HF.

## 2017-11-03 NOTE — PROVIDER CONTACT NOTE (OTHER) - ASSESSMENT
PT A&OX3, vss. Pt c/o of difficulty urinating. NO c/o of chest pain sob or palpitations. Abd nondistended, soft.

## 2017-11-03 NOTE — PROGRESS NOTE ADULT - ATTENDING COMMENTS
Sitting in chair, no distress, no chest pain, wheezing, cough. Tele-  A.fib  - Reviewed labs, Echo, Spoke to EP, Cardiology and Renal.  - Would c/w Lopressor, Amiodarone, IV heparin gtt and watch over the weekend.  No Diuretic for now  - Might need PPM and Ischemic eval depending on Tele observation   - c/w Xopenex, dose PO prednisone, inhaled steroid for asthma  ** spoke to family at bedside

## 2017-11-03 NOTE — PROGRESS NOTE ADULT - ASSESSMENT
Patient is an 85yoF w/ recent Afib, recurrent UTIs, CKD (for past 10 years; sees Nephrologist in [place]), DM (for more than 15 years), who p/w SOB, hyperK and ?SAM. Renal called for SAM.

## 2017-11-04 DIAGNOSIS — N17.9 ACUTE KIDNEY FAILURE, UNSPECIFIED: ICD-10-CM

## 2017-11-04 LAB
ANION GAP SERPL CALC-SCNC: 17 MMOL/L — SIGNIFICANT CHANGE UP (ref 5–17)
APTT BLD: 83.2 SEC — HIGH (ref 27.5–37.4)
BUN SERPL-MCNC: 53 MG/DL — HIGH (ref 7–23)
C3 SERPL-MCNC: 109 MG/DL — SIGNIFICANT CHANGE UP (ref 80–180)
C4 SERPL-MCNC: 35 MG/DL — SIGNIFICANT CHANGE UP (ref 10–45)
CALCIUM SERPL-MCNC: 9.4 MG/DL — SIGNIFICANT CHANGE UP (ref 8.4–10.5)
CHLORIDE SERPL-SCNC: 97 MMOL/L — SIGNIFICANT CHANGE UP (ref 96–108)
CO2 SERPL-SCNC: 20 MMOL/L — LOW (ref 22–31)
CREAT SERPL-MCNC: 2.39 MG/DL — HIGH (ref 0.5–1.3)
GLUCOSE BLDC GLUCOMTR-MCNC: 109 MG/DL — HIGH (ref 70–99)
GLUCOSE BLDC GLUCOMTR-MCNC: 191 MG/DL — HIGH (ref 70–99)
GLUCOSE BLDC GLUCOMTR-MCNC: 204 MG/DL — HIGH (ref 70–99)
GLUCOSE BLDC GLUCOMTR-MCNC: 247 MG/DL — HIGH (ref 70–99)
GLUCOSE SERPL-MCNC: 88 MG/DL — SIGNIFICANT CHANGE UP (ref 70–99)
HAV IGM SER-ACNC: SIGNIFICANT CHANGE UP
HBV CORE AB SER-ACNC: REACTIVE
HBV CORE IGM SER-ACNC: SIGNIFICANT CHANGE UP
HBV SURFACE AB SER-ACNC: 9.2 MIU/ML — LOW
HBV SURFACE AG SER-ACNC: SIGNIFICANT CHANGE UP
HCT VFR BLD CALC: 36.1 % — SIGNIFICANT CHANGE UP (ref 34.5–45)
HCV AB S/CO SERPL IA: 0.12 S/CO — SIGNIFICANT CHANGE UP
HCV AB SERPL-IMP: SIGNIFICANT CHANGE UP
HGB BLD-MCNC: 11.8 G/DL — SIGNIFICANT CHANGE UP (ref 11.5–15.5)
HIV 1+2 AB+HIV1 P24 AG SERPL QL IA: SIGNIFICANT CHANGE UP
INR BLD: 1.02 RATIO — SIGNIFICANT CHANGE UP (ref 0.88–1.16)
KAPPA LC SER QL IFE: 6.04 MG/DL — HIGH (ref 0.33–1.94)
KAPPA/LAMBDA FREE LIGHT CHAIN RATIO, SERUM: 2.03 RATIO — HIGH (ref 0.26–1.65)
LAMBDA LC SER QL IFE: 2.98 MG/DL — HIGH (ref 0.57–2.63)
MCHC RBC-ENTMCNC: 28.9 PG — SIGNIFICANT CHANGE UP (ref 27–34)
MCHC RBC-ENTMCNC: 32.7 GM/DL — SIGNIFICANT CHANGE UP (ref 32–36)
MCV RBC AUTO: 88.3 FL — SIGNIFICANT CHANGE UP (ref 80–100)
PLATELET # BLD AUTO: 382 K/UL — SIGNIFICANT CHANGE UP (ref 150–400)
POTASSIUM SERPL-MCNC: 4.7 MMOL/L — SIGNIFICANT CHANGE UP (ref 3.5–5.3)
POTASSIUM SERPL-SCNC: 4.7 MMOL/L — SIGNIFICANT CHANGE UP (ref 3.5–5.3)
PROT SERPL-MCNC: 7 G/DL — SIGNIFICANT CHANGE UP (ref 6–8.3)
PROT SERPL-MCNC: 7 G/DL — SIGNIFICANT CHANGE UP (ref 6–8.3)
PROTHROM AB SERPL-ACNC: 11.5 SEC — SIGNIFICANT CHANGE UP (ref 10–13.1)
RBC # BLD: 4.09 M/UL — SIGNIFICANT CHANGE UP (ref 3.8–5.2)
RBC # FLD: 13.4 % — SIGNIFICANT CHANGE UP (ref 10.3–14.5)
SODIUM SERPL-SCNC: 134 MMOL/L — LOW (ref 135–145)
WBC # BLD: 9.38 K/UL — SIGNIFICANT CHANGE UP (ref 3.8–10.5)
WBC # FLD AUTO: 9.38 K/UL — SIGNIFICANT CHANGE UP (ref 3.8–10.5)

## 2017-11-04 PROCEDURE — 99233 SBSQ HOSP IP/OBS HIGH 50: CPT

## 2017-11-04 RX ADMIN — Medication 2: at 17:02

## 2017-11-04 RX ADMIN — Medication 20 MILLIGRAM(S): at 05:24

## 2017-11-04 RX ADMIN — Medication 12.5 MILLIGRAM(S): at 05:25

## 2017-11-04 RX ADMIN — Medication 650 MILLIGRAM(S): at 03:10

## 2017-11-04 RX ADMIN — INSULIN GLARGINE 7 UNIT(S): 100 INJECTION, SOLUTION SUBCUTANEOUS at 21:26

## 2017-11-04 RX ADMIN — Medication 1: at 12:32

## 2017-11-04 RX ADMIN — HEPARIN SODIUM 900 UNIT(S)/HR: 5000 INJECTION INTRAVENOUS; SUBCUTANEOUS at 09:37

## 2017-11-04 RX ADMIN — Medication 0.5 MILLIGRAM(S): at 05:25

## 2017-11-04 RX ADMIN — Medication 0.5 MILLIGRAM(S): at 17:04

## 2017-11-04 RX ADMIN — LEVALBUTEROL 0.63 MILLIGRAM(S): 1.25 SOLUTION, CONCENTRATE RESPIRATORY (INHALATION) at 21:26

## 2017-11-04 RX ADMIN — LEVALBUTEROL 0.63 MILLIGRAM(S): 1.25 SOLUTION, CONCENTRATE RESPIRATORY (INHALATION) at 13:05

## 2017-11-04 RX ADMIN — Medication 12.5 MILLIGRAM(S): at 17:04

## 2017-11-04 RX ADMIN — Medication 650 MILLIGRAM(S): at 02:06

## 2017-11-04 RX ADMIN — Medication 75 MICROGRAM(S): at 05:30

## 2017-11-04 RX ADMIN — AMIODARONE HYDROCHLORIDE 200 MILLIGRAM(S): 400 TABLET ORAL at 05:24

## 2017-11-04 RX ADMIN — LEVALBUTEROL 0.63 MILLIGRAM(S): 1.25 SOLUTION, CONCENTRATE RESPIRATORY (INHALATION) at 05:25

## 2017-11-04 RX ADMIN — AMLODIPINE BESYLATE 10 MILLIGRAM(S): 2.5 TABLET ORAL at 05:24

## 2017-11-04 RX ADMIN — Medication 5 MILLIGRAM(S): at 21:26

## 2017-11-04 NOTE — PROGRESS NOTE ADULT - ATTENDING COMMENTS
Assessment    85 year old female acute pulmonary edema in setting of elevated BP, likely acute HFpEF, new on set AF, SAM    Plan:  1.  Continue with anticoagulation with heparin gtt  2.  WIll attempt rhythm control strategy - continue with amiodarone.  Remains in Afib.    3.  No pauses on telemetry, continue tele  4.  BP well controlled  5.  Euvolemic on exam.

## 2017-11-04 NOTE — PROGRESS NOTE ADULT - PROBLEM SELECTOR PLAN 7
- Monitor FST qAC and qHS. Coverage with [employer] for now.  - HBa1c = 5.6 - C/w outpatient dosage synthroid 75mcg daily.  - follow up TSH

## 2017-11-04 NOTE — PROGRESS NOTE ADULT - PROBLEM SELECTOR PROBLEM 6
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

## 2017-11-04 NOTE — PROGRESS NOTE ADULT - SUBJECTIVE AND OBJECTIVE BOX
PAGER:  [phone]               [facility] [address]              EMAIL [email or web address]   OFFICE [phone]                              ********VASCULAR MEDICINE & CARDIOLOGY PROGRESS NOTE********                            CC:  SOB, afib    INTERVAL HISTORY:  Remains in atrial fibrillation on tele with rate of 80.  BP is controlled.  Denies any shortness of breath or chest pain.  She is currently on a heparin gtt.      HISTORY OF PRESENT ILLNESS:  HPI:  86yo F w/ PMHx of DM, HTN, hypothyroidism, asthma, newly diagnosed afib BIBEMS yesterday for sob and n/v at home. Patient recently moved back to the  5 months ago. She was under normal health status and compliant on her medication until 5 days ago. Patient felt generalized weakness at that time; over the weekend, patient felt worsening sob especially with ambulation/exertion. (Patient at baseline walks with a cane at home freely.) Yesterday, patient started to develop nausea and vomitting 4 episodes NBNB. Patient was not able to tolerate po intake. Otherwise, patient denied fever, chills, no diarrhea/constipation, no chest pain, no dizziness, no urinary burning or difficulty.     In the ER, patient was found to have sinus armand ~55, BP elevated 200s/100s, Sating well 100 on 10/5 35% bipap and remains afebrile. Patient was found to have hyperK to 6.3 in the context of SAM. She was given duonebs x 3, calcium gluconate, regular 10u, lasix 8mg, kayxalate with adquate UOP. She was also given solumedrol 125mg given concerns for asthma exacerbation. (01 Nov 2017 02:39)          Allergies    Iron 100 (Rash)    Intolerances    	    MEDICATIONS:  amiodarone    Tablet 200 milliGRAM(s) Oral daily  amLODIPine   Tablet 10 milliGRAM(s) Oral daily  heparin  Infusion.  Unit(s)/Hr IV Continuous <Continuous>  heparin  Injectable 6000 Unit(s) IV Push every 6 hours PRN  heparin  Injectable 3000 Unit(s) IV Push every 6 hours PRN  metoprolol     tartrate 12.5 milliGRAM(s) Oral two times a day      buDESOnide   0.5 milliGRAM(s) Respule 0.5 milliGRAM(s) Inhalation two times a day  levalbuterol Inhalation 0.63 milliGRAM(s) Inhalation every 8 hours    acetaminophen   Tablet. 650 milliGRAM(s) Oral every 6 hours PRN    bisacodyl 5 milliGRAM(s) Oral at bedtime    dextrose 50% Injectable 12.5 Gram(s) IV Push once  dextrose 50% Injectable 25 Gram(s) IV Push once  dextrose 50% Injectable 25 Gram(s) IV Push once  dextrose Gel 1 Dose(s) Oral once PRN  glucagon  Injectable 1 milliGRAM(s) IntraMuscular once PRN  insulin glargine Injectable (LANTUS) 7 Unit(s) SubCutaneous at bedtime  insulin lispro (HumaLOG) corrective regimen sliding scale   SubCutaneous three times a day before meals  insulin lispro (HumaLOG) corrective regimen sliding scale   SubCutaneous at bedtime  levothyroxine 75 MICROGram(s) Oral daily    dextrose 5%. 1000 milliLiter(s) IV Continuous <Continuous>  sodium chloride 0.9%. 1000 milliLiter(s) IV Continuous <Continuous>      PAST MEDICAL & SURGICAL HISTORY:  Asthma  Diabetes  Hypothyroid  HTN (hypertension)  History of renal stent  H/O abdominal hysterectomy      FAMILY HISTORY:      SOCIAL HISTORY:  unchanged    REVIEW OF SYSTEMS:  CONSTITUTIONAL: No fever, weight loss, or fatigue  EYES: No eye pain, visual disturbances, or discharge  ENMT:  No difficulty hearing, tinnitus, vertigo; No sinus or throat pain  NECK: No pain or stiffness  RESPIRATORY: No cough, wheezing, chills or hemoptysis; No Shortness of Breath  CARDIOVASCULAR: No chest pain, palpitations, passing out, dizziness, or leg swelling  GASTROINTESTINAL: No abdominal or epigastric pain. No nausea, vomiting, or hematemesis; No diarrhea or constipation. No melena or hematochezia.  GENITOURINARY: No dysuria, frequency, hematuria, or incontinence  NEUROLOGICAL: No headaches, memory loss, loss of strength, numbness, or tremors  SKIN: No itching, burning, rashes, or lesions   LYMPH Nodes: No enlarged glands  ENDOCRINE: No heat or cold intolerance; No hair loss  MUSCULOSKELETAL: No joint pain or swelling; No muscle, back, or extremity pain  PSYCHIATRIC: No depression, anxiety, mood swings, or difficulty sleeping  HEME/LYMPH: No easy bruising, or bleeding gums  ALLERY AND IMMUNOLOGIC: No hives or eczema	    [x ] All others negative	  [ ] Unable to obtain    PHYSICAL EXAM:  T(C): 36.7 (11-04-17 @ 11:59), Max: 36.7 (11-04-17 @ 11:59)  HR: 85 (11-04-17 @ 11:59) (73 - 85)  BP: 124/75 (11-04-17 @ 11:59) (111/71 - 153/76)  RR: 18 (11-04-17 @ 11:59) (18 - 18)  SpO2: 98% (11-04-17 @ 11:59) (98% - 100%)  Wt(kg): --  I&O's Summary    03 Nov 2017 07:01  -  04 Nov 2017 07:00  --------------------------------------------------------  IN: 908 mL / OUT: 401 mL / NET: 507 mL    04 Nov 2017 08:01  -  04 Nov 2017 13:23  --------------------------------------------------------  IN: 0 mL / OUT: 950 mL / NET: -950 mL        Appearance: Normal	  HEENT:   Normal oral mucosa, PERRL, EOMI	  Lymphatic: No lymphadenopathy  Cardiovascular: Irregularly irregular at a rate of 80  Respiratory: Lungs clear to auscultation	  Psychiatry: A & O x 3, Mood & affect appropriate  Gastrointestinal:  Soft, Non-tender, + BS	  Skin: No rashes, No ecchymoses, No cyanosis	  Neurologic: Non-focal  Extremities: Normal range of motion, No clubbing, cyanosis or edema        LABS:	 	    CBC Full  -  ( 04 Nov 2017 08:54 )  WBC Count : 9.38 K/uL  Hemoglobin : 11.8 g/dL  Hematocrit : 36.1 %  Platelet Count - Automated : 382 K/uL  Mean Cell Volume : 88.3 fl  Mean Cell Hemoglobin : 28.9 pg  Mean Cell Hemoglobin Concentration : 32.7 gm/dL  Auto Neutrophil # : x  Auto Lymphocyte # : x  Auto Monocyte # : x  Auto Eosinophil # : x  Auto Basophil # : x  Auto Neutrophil % : x  Auto Lymphocyte % : x  Auto Monocyte % : x  Auto Eosinophil % : x  Auto Basophil % : x    11-04    134<L>  |  97  |  53<H>  ----------------------------<  88  4.7   |  20<L>  |  2.39<H>  11-03    130<L>  |  93<L>  |  52<H>  ----------------------------<  108<H>  4.5   |  20<L>  |  2.74<H>    Ca    9.4      04 Nov 2017 09:04  Ca    8.5      03 Nov 2017 07:52    TPro  7.0  /  Alb  x   /  TBili  x   /  DBili  x   /  AST  x   /  ALT  x   /  AlkPhos  x   11-04

## 2017-11-04 NOTE — PROGRESS NOTE ADULT - PROBLEM SELECTOR PLAN 6
-Currently with no signs of edema or fluid overload  -pt euvolemic on exam - Monitor FST qAC and qHS. Coverage with SSI for now.  - HBa1c = 5.6

## 2017-11-04 NOTE — PROGRESS NOTE ADULT - PROBLEM SELECTOR PLAN 8
- C/w outpatient dosage synthroid 75mcg daily.  - follow up TSH - pt on heparin gtt  - diabetes diet

## 2017-11-04 NOTE — PROGRESS NOTE ADULT - PROBLEM SELECTOR PLAN 3
- SAM with Cr. 2.5 on admission with associated hyperkalemia; likely pre-renal in setting of poor LV function on bedside echo in ED. Unknown baseline. Cr now 2.7 on 11/3  - s/p 250ml NS yesterday  - Renal U/S with b/l mild hydronephrosis  - UA with RBCs and no evidence of infection  - pending glomerulonephritis labs  - strict I&Os No wheez, cough  - c/w oral prednisone 20mg qD for 3 days.   - c/w Pulmicort 0.5mg BID  - c/w xopinex 0.63 q8h

## 2017-11-04 NOTE — PROGRESS NOTE ADULT - SUBJECTIVE AND OBJECTIVE BOX
Leonela Medina MD  PGY 2  Pager [phone]      Patient is a 85y old  Female who presents with a chief complaint of SOB, n/v (01 Nov 2017 02:39)      INTERVAL HPI/OVERNIGHT EVENTS: this AM patient feels well; has no complaints. Afib 60s on tele.         REVIEW OF SYSTEMS:  CONSTITUTIONAL: No fever, chills  ENMT:  No difficulty hearing, no change in vision  NECK: No pain or stiffness  RESPIRATORY: No cough, SOB  CARDIOVASCULAR: No chest pain, palpitations  GASTROINTESTINAL: No abdominal pain. No nausea, vomiting, or diarrhea  GENITOURINARY: No dysuria  NEUROLOGICAL: No HA  SKIN: No itching, burning, rashes, or lesions   LYMPH NODES: No enlarged glands  ENDOCRINE: No heat or cold intolerance; No hair loss  MUSCULOSKELETAL: No joint pain or swelling; No muscle, back, or extremity pain  PSYCHIATRIC: No depression, anxiety  HEME/LYMPH: No easy bruising, or bleeding gums    T(C): 36.7 (11-04-17 @ 11:59), Max: 36.7 (11-04-17 @ 11:59)  HR: 85 (11-04-17 @ 11:59) (73 - 85)  BP: 124/75 (11-04-17 @ 11:59) (111/71 - 153/76)  RR: 18 (11-04-17 @ 11:59) (18 - 18)  SpO2: 98% (11-04-17 @ 11:59) (98% - 100%)  Wt(kg): --Vital Signs Last 24 Hrs  T(C): 36.7 (04 Nov 2017 11:59), Max: 36.7 (04 Nov 2017 11:59)  T(F): 98.1 (04 Nov 2017 11:59), Max: 98.1 (04 Nov 2017 11:59)  HR: 85 (04 Nov 2017 11:59) (73 - 85)  BP: 124/75 (04 Nov 2017 11:59) (111/71 - 153/76)  BP(mean): --  RR: 18 (04 Nov 2017 11:59) (18 - 18)  SpO2: 98% (04 Nov 2017 11:59) (98% - 100%)    PHYSICAL EXAM:  GENERAL: NAD  EYES: clear conjunctiva; EOMI  ENMT: Moist mucous membranes  NECK: Supple, No JVD, Normal thyroid  CHEST/LUNG: Clear to auscultation bilaterally; No rales, rhonchi, wheezing, or rubs  HEART: S1, S2, Regular rate and rhythm  ABDOMEN: Soft, Nontender, Nondistended; Bowel sounds present  NEURO: Alert & Oriented X 2  EXTREMITIES: No LE edema, no calf tenderness  LYMPH: No lymphadenopathy noted  SKIN: No rashes or lesions    Consultant(s) Notes Reviewed:  [x ] YES  [ ] NO  Care Discussed with Consultants/Other Providers [ x] YES  [ ] NO    LABS:                        11.8   9.38  )-----------( 382      ( 04 Nov 2017 08:54 )             36.1     11-04    134<L>  |  97  |  53<H>  ----------------------------<  88  4.7   |  20<L>  |  2.39<H>    Ca    9.4      04 Nov 2017 09:04    TPro  7.0  /  Alb  x   /  TBili  x   /  DBili  x   /  AST  x   /  ALT  x   /  AlkPhos  x   11-04    PT/INR - ( 04 Nov 2017 09:04 )   PT: 11.5 sec;   INR: 1.02 ratio         PTT - ( 04 Nov 2017 09:04 )  PTT:83.2 sec    CAPILLARY BLOOD GLUCOSE  191 (04 Nov 2017 11:59)  103 (04 Nov 2017 07:59)      POCT Blood Glucose.: 191 mg/dL (04 Nov 2017 12:01)  POCT Blood Glucose.: 109 mg/dL (04 Nov 2017 08:04)  POCT Blood Glucose.: 213 mg/dL (03 Nov 2017 21:26)  POCT Blood Glucose.: 273 mg/dL (03 Nov 2017 16:35)            RADIOLOGY & ADDITIONAL TESTS:    Imaging Personally Reviewed:  [ ] YES  [ ] NO

## 2017-11-04 NOTE — PROGRESS NOTE ADULT - PROBLEM SELECTOR PLAN 4
- resolving hypovolemic hyponatremia sodium 134 today.   - continue to monitor BMP daily Baseline Cr around 2.0, Renal plan noted. CKD is likely from DM  - Held diuretic  - Serology for vasculitis, infectious, MM in progress

## 2017-11-04 NOTE — PROGRESS NOTE ADULT - PROBLEM SELECTOR PLAN 1
Consider etiology flash pulmonary edema in setting of hypertensive emergency (SBP of 200 on admission) vs asthma exacerbation; however CXR not supportive of flash pulm edema  - Patient currently maintaining stable oxygen saturation on room air HR controlled, with newly diagnosed afib at outpatient cardiologist office   - c/w metoprolol 12.5mg bid, amiodarone 200 mg daily  - c/w heparin gtt for anticoagulation. No NOAC  - cards/EP eval and f/u noted.  - Might need PPM if pauses or HB

## 2017-11-04 NOTE — PROGRESS NOTE ADULT - ATTENDING COMMENTS
Breathing better, no complaints of chest pain, SOB, Tele- Sinus to afib with controlled rate  - Cardiology f/u noted, c/w IV heparin gtt, metoprolol 12.5mg daily, Amiodarone 200mg daily  - Might need PPM if HB or pauses and ischemic w/u  - Low dose prednisone taper, nebs, inhaled steroid for now  ** spoke to Grand dtr

## 2017-11-04 NOTE — PROGRESS NOTE ADULT - PROBLEM SELECTOR PLAN 2
- c/w oral prednisone 20mg qD for 3 days.   - c/w Pulmicort 0.5mg BID  - c/w xopinex 0.63 q8h -Currently with no signs of edema or fluid overload  - not on diuretic now

## 2017-11-04 NOTE — PROGRESS NOTE ADULT - PROBLEM SELECTOR PLAN 5
- Patient with newly diagnosed afib at outpatient cardiologist office and started on amiodarone, xarelto, and metoprolol    - c/w amiodarone 200 mg daily  - c/w heparin gtt for anticoagulation  - cards/EP eval called for eval of trifasc block on admission with sinus bradycardia (1st degree AV block) and possible nonemergent PPM Resolved, Probably was a combination of CHF and Asthma

## 2017-11-05 LAB
ANA PAT FLD IF-IMP: ABNORMAL
ANA TITR SER: ABNORMAL
ANION GAP SERPL CALC-SCNC: 21 MMOL/L — HIGH (ref 5–17)
APTT BLD: 59.7 SEC — HIGH (ref 27.5–37.4)
BUN SERPL-MCNC: 52 MG/DL — HIGH (ref 7–23)
CALCIUM SERPL-MCNC: 9.7 MG/DL — SIGNIFICANT CHANGE UP (ref 8.4–10.5)
CHLORIDE SERPL-SCNC: 101 MMOL/L — SIGNIFICANT CHANGE UP (ref 96–108)
CO2 SERPL-SCNC: 18 MMOL/L — LOW (ref 22–31)
CREAT SERPL-MCNC: 2.3 MG/DL — HIGH (ref 0.5–1.3)
CREATININE, URINE RESULT: 23 MG/DL — SIGNIFICANT CHANGE UP
GLUCOSE BLDC GLUCOMTR-MCNC: 209 MG/DL — HIGH (ref 70–99)
GLUCOSE BLDC GLUCOMTR-MCNC: 212 MG/DL — HIGH (ref 70–99)
GLUCOSE BLDC GLUCOMTR-MCNC: 282 MG/DL — HIGH (ref 70–99)
GLUCOSE BLDC GLUCOMTR-MCNC: 93 MG/DL — SIGNIFICANT CHANGE UP (ref 70–99)
GLUCOSE SERPL-MCNC: 86 MG/DL — SIGNIFICANT CHANGE UP (ref 70–99)
HCT VFR BLD CALC: 35.7 % — SIGNIFICANT CHANGE UP (ref 34.5–45)
HGB BLD-MCNC: 12 G/DL — SIGNIFICANT CHANGE UP (ref 11.5–15.5)
INR BLD: 1.02 RATIO — SIGNIFICANT CHANGE UP (ref 0.88–1.16)
MCHC RBC-ENTMCNC: 29.3 PG — SIGNIFICANT CHANGE UP (ref 27–34)
MCHC RBC-ENTMCNC: 33.6 GM/DL — SIGNIFICANT CHANGE UP (ref 32–36)
MCV RBC AUTO: 87.1 FL — SIGNIFICANT CHANGE UP (ref 80–100)
PLATELET # BLD AUTO: 369 K/UL — SIGNIFICANT CHANGE UP (ref 150–400)
POTASSIUM SERPL-MCNC: 5.2 MMOL/L — SIGNIFICANT CHANGE UP (ref 3.5–5.3)
POTASSIUM SERPL-SCNC: 5.2 MMOL/L — SIGNIFICANT CHANGE UP (ref 3.5–5.3)
PROT ?TM UR-MCNC: 44 MG/DL — HIGH (ref 0–12)
PROTHROM AB SERPL-ACNC: 11.5 SEC — SIGNIFICANT CHANGE UP (ref 10–13.1)
RBC # BLD: 4.1 M/UL — SIGNIFICANT CHANGE UP (ref 3.8–5.2)
RBC # FLD: 13.5 % — SIGNIFICANT CHANGE UP (ref 10.3–14.5)
SODIUM SERPL-SCNC: 140 MMOL/L — SIGNIFICANT CHANGE UP (ref 135–145)
T PALLIDUM AB TITR SER: NEGATIVE — SIGNIFICANT CHANGE UP
WBC # BLD: 8.66 K/UL — SIGNIFICANT CHANGE UP (ref 3.8–10.5)
WBC # FLD AUTO: 8.66 K/UL — SIGNIFICANT CHANGE UP (ref 3.8–10.5)

## 2017-11-05 PROCEDURE — 99233 SBSQ HOSP IP/OBS HIGH 50: CPT

## 2017-11-05 PROCEDURE — 99232 SBSQ HOSP IP/OBS MODERATE 35: CPT

## 2017-11-05 RX ADMIN — HEPARIN SODIUM 900 UNIT(S)/HR: 5000 INJECTION INTRAVENOUS; SUBCUTANEOUS at 09:25

## 2017-11-05 RX ADMIN — Medication 0.5 MILLIGRAM(S): at 19:33

## 2017-11-05 RX ADMIN — Medication 20 MILLIGRAM(S): at 05:18

## 2017-11-05 RX ADMIN — LEVALBUTEROL 0.63 MILLIGRAM(S): 1.25 SOLUTION, CONCENTRATE RESPIRATORY (INHALATION) at 22:18

## 2017-11-05 RX ADMIN — Medication 2: at 12:28

## 2017-11-05 RX ADMIN — Medication 5 MILLIGRAM(S): at 22:18

## 2017-11-05 RX ADMIN — Medication 75 MICROGRAM(S): at 05:18

## 2017-11-05 RX ADMIN — Medication 0.5 MILLIGRAM(S): at 05:18

## 2017-11-05 RX ADMIN — Medication 12.5 MILLIGRAM(S): at 16:57

## 2017-11-05 RX ADMIN — AMLODIPINE BESYLATE 10 MILLIGRAM(S): 2.5 TABLET ORAL at 05:18

## 2017-11-05 RX ADMIN — Medication 12.5 MILLIGRAM(S): at 05:18

## 2017-11-05 RX ADMIN — Medication 3: at 16:57

## 2017-11-05 RX ADMIN — LEVALBUTEROL 0.63 MILLIGRAM(S): 1.25 SOLUTION, CONCENTRATE RESPIRATORY (INHALATION) at 05:18

## 2017-11-05 RX ADMIN — AMIODARONE HYDROCHLORIDE 200 MILLIGRAM(S): 400 TABLET ORAL at 05:18

## 2017-11-05 RX ADMIN — INSULIN GLARGINE 7 UNIT(S): 100 INJECTION, SOLUTION SUBCUTANEOUS at 22:18

## 2017-11-05 NOTE — PROGRESS NOTE ADULT - SUBJECTIVE AND OBJECTIVE BOX
PAGER:  [phone]               [facility] [id number]              EMAIL [email or web address]   OFFICE [phone]                              ********GENERAL CARDIOLOGY PROGRESS NOTE********                            CC:  SOB, afib    INTERVAL HISTORY:  Remains in atrial fibrillation on tele with rate of 60-90.  BP is controlled.  Denies any shortness of breath or chest pain.  She is currently on a heparin gtt.  Seen by EPS this morning with plans for DCCV tomorrow.      HISTORY OF PRESENT ILLNESS:  HPI:  86yo F w/ PMHx of DM, HTN, hypothyroidism, asthma, newly diagnosed afib BIBEMS yesterday for sob and n/v at home. Patient recently moved back to the  5 months ago. She was under normal health status and compliant on her medication until 5 days ago. Patient felt generalized weakness at that time; over the weekend, patient felt worsening sob especially with ambulation/exertion. (Patient at baseline walks with a cane at home freely.) Yesterday, patient started to develop nausea and vomitting 4 episodes NBNB. Patient was not able to tolerate po intake. Otherwise, patient denied fever, chills, no diarrhea/constipation, no chest pain, no dizziness, no urinary burning or difficulty.     In the ER, patient was found to have sinus armand ~55, BP elevated 200s/100s, Sating well 100 on 10/5 35% bipap and remains afebrile. Patient was found to have hyperK to 6.3 in the context of SAM. She was given duonebs x 3, calcium gluconate, regular 10u, lasix 8mg, kayxalate with adquate UOP. She was also given solumedrol 125mg given concerns for asthma exacerbation. (01 Nov 2017 02:39)          Allergies    Iron 100 (Rash)    Intolerances    	MEDICATIONS  (STANDING):  amiodarone    Tablet 200 milliGRAM(s) Oral daily  amLODIPine   Tablet 10 milliGRAM(s) Oral daily  bisacodyl 5 milliGRAM(s) Oral at bedtime  buDESOnide   0.5 milliGRAM(s) Respule 0.5 milliGRAM(s) Inhalation two times a day  dextrose 5%. 1000 milliLiter(s) (50 mL/Hr) IV Continuous <Continuous>  dextrose 50% Injectable 12.5 Gram(s) IV Push once  dextrose 50% Injectable 25 Gram(s) IV Push once  dextrose 50% Injectable 25 Gram(s) IV Push once  heparin  Infusion.  Unit(s)/Hr (13 mL/Hr) IV Continuous <Continuous>  insulin glargine Injectable (LANTUS) 7 Unit(s) SubCutaneous at bedtime  insulin lispro (HumaLOG) corrective regimen sliding scale   SubCutaneous three times a day before meals  insulin lispro (HumaLOG) corrective regimen sliding scale   SubCutaneous at bedtime  levalbuterol Inhalation 0.63 milliGRAM(s) Inhalation every 8 hours  levothyroxine 75 MICROGram(s) Oral daily  metoprolol     tartrate 12.5 milliGRAM(s) Oral two times a day    MEDICATIONS  (PRN):  acetaminophen   Tablet. 650 milliGRAM(s) Oral every 6 hours PRN Pain  dextrose Gel 1 Dose(s) Oral once PRN Blood Glucose LESS THAN 70 milliGRAM(s)/deciliter  glucagon  Injectable 1 milliGRAM(s) IntraMuscular once PRN Glucose LESS THAN 70 milligrams/deciliter  heparin  Injectable 6000 Unit(s) IV Push every 6 hours PRN For aPTT less than 40  heparin  Injectable 3000 Unit(s) IV Push every 6 hours PRN For aPTT between 40 - 57    PAST MEDICAL & SURGICAL HISTORY:  Asthma  Diabetes  Hypothyroid  HTN (hypertension)  History of renal stent  H/O abdominal hysterectomy      FAMILY HISTORY:      SOCIAL HISTORY:  unchanged    REVIEW OF SYSTEMS:  CONSTITUTIONAL: No fever, weight loss, or fatigue  EYES: No eye pain, visual disturbances, or discharge  ENMT:  No difficulty hearing, tinnitus, vertigo; No sinus or throat pain  NECK: No pain or stiffness  RESPIRATORY: No cough, wheezing, chills or hemoptysis; No Shortness of Breath  CARDIOVASCULAR: No chest pain, palpitations, passing out, dizziness, or leg swelling  GASTROINTESTINAL: No abdominal or epigastric pain. No nausea, vomiting, or hematemesis; No diarrhea or constipation. No melena or hematochezia.  GENITOURINARY: No dysuria, frequency, hematuria, or incontinence  NEUROLOGICAL: No headaches, memory loss, loss of strength, numbness, or tremors  SKIN: No itching, burning, rashes, or lesions   LYMPH Nodes: No enlarged glands  ENDOCRINE: No heat or cold intolerance; No hair loss  MUSCULOSKELETAL: No joint pain or swelling; No muscle, back, or extremity pain  PSYCHIATRIC: No depression, anxiety, mood swings, or difficulty sleeping  HEME/LYMPH: No easy bruising, or bleeding gums  ALLERY AND IMMUNOLOGIC: No hives or eczema	    [x ] All others negative	  [ ] Unable to obtain    PHYSICAL EXAM:  ICU Vital Signs Last 24 Hrs  T(C): 36.4 (05 Nov 2017 04:16), Max: 36.7 (04 Nov 2017 11:59)  T(F): 97.5 (05 Nov 2017 04:16), Max: 98.1 (04 Nov 2017 11:59)  HR: 80 (05 Nov 2017 04:16) (80 - 94)  BP: 153/78 (05 Nov 2017 04:16) (124/75 - 158/70)  BP(mean): --  ABP: --  ABP(mean): --  RR: 18 (05 Nov 2017 04:16) (18 - 18)  SpO2: 100% (05 Nov 2017 04:16) (98% - 100%)          Appearance: Normal	  HEENT:   Normal oral mucosa, PERRL, EOMI	  Lymphatic: No lymphadenopathy  Cardiovascular: Irregularly irregular at a rate of 80  Respiratory: Lungs clear to auscultation	  Psychiatry: A & O x 3, Mood & affect appropriate  Gastrointestinal:  Soft, Non-tender, + BS	  Skin: No rashes, No ecchymoses, No cyanosis	  Neurologic: Non-focal  Extremities: Normal range of motion, No clubbing, cyanosis or edema                                12.0   8.66  )-----------( 369      ( 05 Nov 2017 08:21 )             35.7   11-04    134<L>  |  97  |  53<H>  ----------------------------<  88  4.7   |  20<L>  |  2.39<H>    Ca    9.4      04 Nov 2017 09:04    TPro  7.0  /  Alb  x   /  TBili  x   /  DBili  x   /  AST  x   /  ALT  x   /  AlkPhos  x   11-04    PT/INR - ( 05 Nov 2017 08:14 )   PT: 11.5 sec;   INR: 1.02 ratio         PTT - ( 05 Nov 2017 08:14 )  PTT:59.7 sec

## 2017-11-05 NOTE — PROGRESS NOTE ADULT - PROBLEM SELECTOR PLAN 2
Currently with no signs of edema or fluid overload  - Formal Echo (11/2) with EF>55, moderate diastolic dysfunction, moderate AS, mild MR, MS, and AR

## 2017-11-05 NOTE — PROGRESS NOTE ADULT - PROBLEM SELECTOR PLAN 4
Baseline Cr around 2.0, Renal plan noted. CKD is likely from DM  - Hold diuretics  - Serology for vasculitis, infectious, MM in progress

## 2017-11-05 NOTE — PROGRESS NOTE ADULT - ASSESSMENT
85 year old woman presented with HFpEF and acute on chronic renal insufficiency.  Creatinine and respiratory status improved.  She has a history of atrial fibrillation and is now in persistent AF despite resumption of amiodarone.  While there is some concern for antiarrhythmic therapy given her advanced conduction disease, with her LVH and diastolic dysfunction she would benefit from restoring atrial kick.  Furthermore, she has proven tolerance of the medication and is without significant armand on telemetry.  Should she demonstrate significant sinus armand/pauses in sinus would reconsider for PPM.  At this time I would plan for electrical cardioversion 11/6.  Plan discussed with patient and family.

## 2017-11-05 NOTE — PROGRESS NOTE ADULT - PROBLEM SELECTOR PLAN 1
HR controlled in 80s overnight, patient continues to be in atrial fibrillation. Recently diagnosed afib at outpatient cardiologist office   - plan for cardioversion tomorrow in AM  - c/w metoprolol 12.5mg bid, amiodarone 200 mg daily  - c/w heparin gtt for anticoagulation. No NOAC  - cards/EP eval and f/u noted  - on telemetry, might need PPM if pauses or HB

## 2017-11-05 NOTE — PROGRESS NOTE ADULT - ATTENDING COMMENTS
Assessment  85 year old female acute pulmonary edema in setting of elevated BP, likely acute HFpEF, new on set AF, SAM    Plan:  1.  Continue with anticoagulation with heparin gtt  2.  WIll attempt rhythm control strategy - continue with amiodarone.  Remains in Afib.  Appreciate EPS followup - plans for DC cardioversion tomorrow.   3.  No pauses on telemetry, continue tele  4.  BP adequately controlled  5.  Euvolemic on exam.    NPO after midnight please

## 2017-11-05 NOTE — PROGRESS NOTE ADULT - ATTENDING COMMENTS
Feels mild weak, no chest pain, SOB or lightheaded  - Tele- converted to sinus around 12:30p, now sinus armand around 55 and has a puase 1.7 sec  spoke to Emir- Dr Cardona  - c/w Low dose metoprolol, amiodarone, IV heparin gtt  EP plans for Cardioversion tomorrow if a.fib, Might need PPM  - NPO past MN  ** spoke to Daughter-in-law at bedside

## 2017-11-05 NOTE — PROGRESS NOTE ADULT - PROBLEM SELECTOR PLAN 6
- Monitor FST qAC and qHS. Coverage with SSI for now.  - HBa1c = 5.6 - c/w outpatient dosage synthroid 75mcg daily

## 2017-11-05 NOTE — PROGRESS NOTE ADULT - SUBJECTIVE AND OBJECTIVE BOX
Patient is a 85y old  Female who presents with a chief complaint of SOB, n/v (01 Nov 2017 02:39).      Breathing is improved.  No chest pain.  She admits to mild lightheadedness.         PAST MEDICAL & SURGICAL HISTORY:  Asthma  Diabetes  Hypothyroid  HTN (hypertension)  History of renal stent  H/O abdominal hysterectomy    FINDINGS:  MEDICATIONS  (STANDING):  amiodarone    Tablet 200 milliGRAM(s) Oral daily  amLODIPine   Tablet 10 milliGRAM(s) Oral daily  bisacodyl 5 milliGRAM(s) Oral at bedtime  buDESOnide   0.5 milliGRAM(s) Respule 0.5 milliGRAM(s) Inhalation two times a day  dextrose 5%. 1000 milliLiter(s) (50 mL/Hr) IV Continuous <Continuous>  dextrose 50% Injectable 12.5 Gram(s) IV Push once  dextrose 50% Injectable 25 Gram(s) IV Push once  dextrose 50% Injectable 25 Gram(s) IV Push once  heparin  Infusion.  Unit(s)/Hr (13 mL/Hr) IV Continuous <Continuous>  insulin glargine Injectable (LANTUS) 7 Unit(s) SubCutaneous at bedtime  insulin lispro (HumaLOG) corrective regimen sliding scale   SubCutaneous three times a day before meals  insulin lispro (HumaLOG) corrective regimen sliding scale   SubCutaneous at bedtime  levalbuterol Inhalation 0.63 milliGRAM(s) Inhalation every 8 hours  levothyroxine 75 MICROGram(s) Oral daily  metoprolol     tartrate 12.5 milliGRAM(s) Oral two times a day    MEDICATIONS  (PRN):  acetaminophen   Tablet. 650 milliGRAM(s) Oral every 6 hours PRN Pain  dextrose Gel 1 Dose(s) Oral once PRN Blood Glucose LESS THAN 70 milliGRAM(s)/deciliter  glucagon  Injectable 1 milliGRAM(s) IntraMuscular once PRN Glucose LESS THAN 70 milligrams/deciliter  heparin  Injectable 6000 Unit(s) IV Push every 6 hours PRN For aPTT less than 40  heparin  Injectable 3000 Unit(s) IV Push every 6 hours PRN For aPTT between 40 - 57      REVIEW OF SYSTEMS:    CONSTITUTIONAL: No fever, weight loss, chills, shakes, or fatigue  EYES: No eye pain, visual disturbances, or discharge  ENMT:  No difficulty hearing, tinnitus, vertigo; No sinus or throat pain  NECK: No pain or stiffness  BREASTS: No pain, masses, or nipple discharge  RESPIRATORY: No cough, wheezing, hemoptysis, or shortness of breath  CARDIOVASCULAR: No chest pain, dyspnea, palpitations, dizziness, syncope, paroxysmal nocturnal dyspnea, orthopnea, or arm or leg swelling  GASTROINTESTINAL: No abdominal  or epigastric pain, nausea, vomiting, hematemesis, diarrhea, constipation, melena or bright red blood.  GENITOURINARY: No dysuria, nocturia, hematuria, or urinary incontinence  NEUROLOGICAL: No headaches, memory loss, slurred speech, limb weakness, loss of strength, numbness, or tremors  SKIN: No itching, burning, rashes, or lesions   LYMPH NODES: No enlarged glands  ENDOCRINE: No heat or cold intolerance, or hair loss  MUSCULOSKELETAL: No joint pain or swelling, muscle, back, or extremity pain  PSYCHIATRIC: No depression, anxiety, or difficulty sleeping  HEME/LYMPH: No easy bruising or bleeding gums  ALLERY AND IMMUNOLOGIC: No hives or rash.      Vital Signs Last 24 Hrs  T(C): 36.4 (05 Nov 2017 04:16), Max: 36.7 (04 Nov 2017 11:59)  T(F): 97.5 (05 Nov 2017 04:16), Max: 98.1 (04 Nov 2017 11:59)  HR: 80 (05 Nov 2017 04:16) (80 - 94)  BP: 153/78 (05 Nov 2017 04:16) (124/75 - 158/70)  BP(mean): --  RR: 18 (05 Nov 2017 04:16) (18 - 18)  SpO2: 100% (05 Nov 2017 04:16) (98% - 100%)    PHYSICAL EXAM:    GENERAL: In no apparent distress, well nourished, and hydrated.  HEAD:  Atraumatic, Normocephalic  EYES: EOMI, PERRLA, conjunctiva and sclera clear  ENMT: No tonsillar erythema, exudates, or enlargement; Moist mucous membranes, Good dentition, No lesions  NECK: Supple and normal thyroid.  No JVD or carotid bruit.  Carotid pulse is 2+ bilaterally.  HEART: Irregular rate and rhythm; II/VI systolic murmur  PULMONARY: Clear to auscultation and perfusion.  No rales, wheezing, or rhonchi bilaterally.  ABDOMEN: Soft, Nontender, Nondistended; Bowel sounds present  EXTREMITIES:  2+ Peripheral Pulses, No clubbing, cyanosis, or edema  LYMPH: No lymphadenopathy noted  NEUROLOGICAL: Grossly nonfocal          INTERPRETATION OF TELEMETRY:  Remains AF.       04 Nov 2017 08:01  -  05 Nov 2017 07:00  --------------------------------------------------------  IN:    heparin  Infusion.: 90 mL    Oral Fluid: 580 mL  Total IN: 670 mL    OUT:    Voided: 1600 mL  Total OUT: 1600 mL    Total NET: -930 mL          LABS:                        11.8   9.38  )-----------( 382      ( 04 Nov 2017 08:54 )             36.1     11-04    134<L>  |  97  |  53<H>  ----------------------------<  88  4.7   |  20<L>  |  2.39<H>    Ca    9.4      04 Nov 2017 09:04    TPro  7.0  /  Alb  x   /  TBili  x   /  DBili  x   /  AST  x   /  ALT  x   /  AlkPhos  x   11-04        PT/INR - ( 04 Nov 2017 09:04 )   PT: 11.5 sec;   INR: 1.02 ratio         PTT - ( 04 Nov 2017 09:04 )  PTT:83.2 sec    BNP  I&O's Detail    04 Nov 2017 08:01  -  05 Nov 2017 07:00  --------------------------------------------------------  IN:    heparin  Infusion.: 90 mL    Oral Fluid: 580 mL  Total IN: 670 mL    OUT:    Voided: 1600 mL  Total OUT: 1600 mL    Total NET: -930 mL

## 2017-11-05 NOTE — PROGRESS NOTE ADULT - PROBLEM SELECTOR PLAN 5
Resolved, Probably was a combination of CHF and Asthma - Monitor FST qAC and qHS. Coverage with [employer] for now.  - HBa1c = 5.6

## 2017-11-05 NOTE — PROGRESS NOTE ADULT - PROBLEM SELECTOR PLAN 3
No wheeze, cough this AM  - c/w oral prednisone 20mg qD for 3 days.   - c/w Pulmicort 0.5mg BID  - c/w xopinex 0.63 q8h

## 2017-11-05 NOTE — PROGRESS NOTE ADULT - SUBJECTIVE AND OBJECTIVE BOX
CC: sob + nausea/vomiting    [employer] Intern: Guillermo Narayanan  Spectralink: [id number]  Pager: [phone] CC: sob + nausea/vomiting    [employer] Intern: Guillermo Narayanan  Spectralink: [id number]  Pager: [phone]    HPI/INTERVAL HISTORY: Overnight, no acute events. On telemetry, patient remains in atrial fibrillation with HR in the 80s.  Patient seen and examined at bedside this AM. Patient has no acute complaints at this time. Denies fevers/chills, nausea/vomiting, shortness of breath, palpitations, diaphoresis.    OBJECTIVE:  VITAL SIGNS:  ICU Vital Signs Last 24 Hrs  T(C): 36.4 (05 Nov 2017 04:16), Max: 36.7 (04 Nov 2017 11:59)  T(F): 97.5 (05 Nov 2017 04:16), Max: 98.1 (04 Nov 2017 11:59)  HR: 80 (05 Nov 2017 04:16) (80 - 94)  BP: 153/78 (05 Nov 2017 04:16) (124/75 - 158/70)  BP(mean): --  ABP: --  ABP(mean): --  RR: 18 (05 Nov 2017 04:16) (18 - 18)  SpO2: 100% (05 Nov 2017 04:16) (98% - 100%)        11-04 @ 08:01  -  11-05 @ 07:00  --------------------------------------------------------  IN: 670 mL / OUT: 1600 mL / NET: -930 mL      CAPILLARY BLOOD GLUCOSE  93 (05 Nov 2017 07:25)      POCT Blood Glucose.: 93 mg/dL (05 Nov 2017 07:26)      PHYSICAL EXAM:  Gen:   HEENT: NC/AT; PERRL, anicteric sclera  Neck: supple, no JVD  Resp: clear to ausculation B/L; no wheezes, rales or rhonchi  Cardiovasc: S1S2 normal; RRR; no murmurs, rubs or gallops  GI: soft, nondistended, nontender; +BS  Extr: warm, well-perfused, PT/DP pulses 2+ B/L; no LE edema  Skin: normal color and turgor  Neuro:     LABS:                        12.0   8.66  )-----------( 369      ( 05 Nov 2017 08:21 )             35.7     11-04    134<L>  |  97  |  53<H>  ----------------------------<  88  4.7   |  20<L>  |  2.39<H>    Ca    9.4      04 Nov 2017 09:04    TPro  7.0  /  Alb  x   /  TBili  x   /  DBili  x   /  AST  x   /  ALT  x   /  AlkPhos  x   11-04    LIVER FUNCTIONS - ( 04 Nov 2017 09:04 )  Alb: x     / Pro: 7.0 g/dL / ALK PHOS: x     / ALT: x     / AST: x     / GGT: x           PT/INR - ( 05 Nov 2017 08:14 )   PT: 11.5 sec;   INR: 1.02 ratio         PTT - ( 05 Nov 2017 08:14 )  PTT:59.7 sec          RADIOLOGY & ADDITIONAL TESTS: Reviewed.    acetaminophen   Tablet. 650 milliGRAM(s) Oral every 6 hours PRN  amiodarone    Tablet 200 milliGRAM(s) Oral daily  amLODIPine   Tablet 10 milliGRAM(s) Oral daily  bisacodyl 5 milliGRAM(s) Oral at bedtime  buDESOnide   0.5 milliGRAM(s) Respule 0.5 milliGRAM(s) Inhalation two times a day  dextrose 5%. 1000 milliLiter(s) IV Continuous <Continuous>  dextrose 50% Injectable 12.5 Gram(s) IV Push once  dextrose 50% Injectable 25 Gram(s) IV Push once  dextrose 50% Injectable 25 Gram(s) IV Push once  dextrose Gel 1 Dose(s) Oral once PRN  glucagon  Injectable 1 milliGRAM(s) IntraMuscular once PRN  heparin  Infusion.  Unit(s)/Hr IV Continuous <Continuous>  heparin  Injectable 6000 Unit(s) IV Push every 6 hours PRN  heparin  Injectable 3000 Unit(s) IV Push every 6 hours PRN  insulin glargine Injectable (LANTUS) 7 Unit(s) SubCutaneous at bedtime  insulin lispro (HumaLOG) corrective regimen sliding scale   SubCutaneous three times a day before meals  insulin lispro (HumaLOG) corrective regimen sliding scale   SubCutaneous at bedtime  levalbuterol Inhalation 0.63 milliGRAM(s) Inhalation every 8 hours  levothyroxine 75 MICROGram(s) Oral daily  metoprolol     tartrate 12.5 milliGRAM(s) Oral two times a day      Iron 100 (Rash) CC: sob + nausea/vomiting    [employer] Intern: Guillermo Narayanan  Spectralink: [phone]  Pager: [phone]    HPI/INTERVAL HISTORY: Overnight, no acute events. On telemetry, patient remains in atrial fibrillation with HR in the 80s.  Patient seen and examined at bedside this AM. Patient has no acute complaints at this time. Denies fevers/chills, nausea/vomiting, shortness of breath, palpitations, diaphoresis.    OBJECTIVE:  VITAL SIGNS:  ICU Vital Signs Last 24 Hrs  T(C): 36.4 (05 Nov 2017 04:16), Max: 36.7 (04 Nov 2017 11:59)  T(F): 97.5 (05 Nov 2017 04:16), Max: 98.1 (04 Nov 2017 11:59)  HR: 80 (05 Nov 2017 04:16) (80 - 94)  BP: 153/78 (05 Nov 2017 04:16) (124/75 - 158/70)  BP(mean): --  ABP: --  ABP(mean): --  RR: 18 (05 Nov 2017 04:16) (18 - 18)  SpO2: 100% (05 Nov 2017 04:16) (98% - 100%)        11-04 @ 08:01  -  11-05 @ 07:00  --------------------------------------------------------  IN: 670 mL / OUT: 1600 mL / NET: -930 mL      CAPILLARY BLOOD GLUCOSE  93 (05 Nov 2017 07:25)      POCT Blood Glucose.: 93 mg/dL (05 Nov 2017 07:26)      PHYSICAL EXAM:  Appearance: AOx3, NAD in bed eating breakfast  HEENT: MMM, PERRL, EOMI, NC/AT  Cardiovascular: normal S1 and S2, RRR, no m/r/g, no edema, normal JVP  Respiratory: Clear to auscultation bilaterally, no wheezes  Gastrointestinal: Soft, non-tender, non-distended, BS+  Musculoskeletal: No clubbing, no joint deformity   Skin: No rashes, no ecchymoses, no cyanosis    LABS:                        12.0   8.66  )-----------( 369      ( 05 Nov 2017 08:21 )             35.7     11-04    134<L>  |  97  |  53<H>  ----------------------------<  88  4.7   |  20<L>  |  2.39<H>    Ca    9.4      04 Nov 2017 09:04    TPro  7.0  /  Alb  x   /  TBili  x   /  DBili  x   /  AST  x   /  ALT  x   /  AlkPhos  x   11-04    LIVER FUNCTIONS - ( 04 Nov 2017 09:04 )  Alb: x     / Pro: 7.0 g/dL / ALK PHOS: x     / ALT: x     / AST: x     / GGT: x           PT/INR - ( 05 Nov 2017 08:14 )   PT: 11.5 sec;   INR: 1.02 ratio         PTT - ( 05 Nov 2017 08:14 )  PTT:59.7 sec          RADIOLOGY & ADDITIONAL TESTS: Reviewed.    acetaminophen   Tablet. 650 milliGRAM(s) Oral every 6 hours PRN  amiodarone    Tablet 200 milliGRAM(s) Oral daily  amLODIPine   Tablet 10 milliGRAM(s) Oral daily  bisacodyl 5 milliGRAM(s) Oral at bedtime  buDESOnide   0.5 milliGRAM(s) Respule 0.5 milliGRAM(s) Inhalation two times a day  dextrose 5%. 1000 milliLiter(s) IV Continuous <Continuous>  dextrose 50% Injectable 12.5 Gram(s) IV Push once  dextrose 50% Injectable 25 Gram(s) IV Push once  dextrose 50% Injectable 25 Gram(s) IV Push once  dextrose Gel 1 Dose(s) Oral once PRN  glucagon  Injectable 1 milliGRAM(s) IntraMuscular once PRN  heparin  Infusion.  Unit(s)/Hr IV Continuous <Continuous>  heparin  Injectable 6000 Unit(s) IV Push every 6 hours PRN  heparin  Injectable 3000 Unit(s) IV Push every 6 hours PRN  insulin glargine Injectable (LANTUS) 7 Unit(s) SubCutaneous at bedtime  insulin lispro (HumaLOG) corrective regimen sliding scale   SubCutaneous three times a day before meals  insulin lispro (HumaLOG) corrective regimen sliding scale   SubCutaneous at bedtime  levalbuterol Inhalation 0.63 milliGRAM(s) Inhalation every 8 hours  levothyroxine 75 MICROGram(s) Oral daily  metoprolol     tartrate 12.5 milliGRAM(s) Oral two times a day      Iron 100 (Rash)

## 2017-11-05 NOTE — PROGRESS NOTE ADULT - PROBLEM SELECTOR PROBLEM 5
Acute respiratory failure with hypoxia and hypercapnia Type 2 diabetes mellitus without complication, unspecified long term insulin use status

## 2017-11-06 LAB
% ALBUMIN: 51.2 % — SIGNIFICANT CHANGE UP
% ALPHA 1: 4.5 % — SIGNIFICANT CHANGE UP
% ALPHA 2: 9.9 % — SIGNIFICANT CHANGE UP
% BETA: 12.6 % — SIGNIFICANT CHANGE UP
% GAMMA, URINE: 31.3 % — SIGNIFICANT CHANGE UP
% GAMMA: 21.8 % — SIGNIFICANT CHANGE UP
ALBUMIN 24H MFR UR ELPH: 33.2 % — SIGNIFICANT CHANGE UP
ALBUMIN SERPL ELPH-MCNC: 3.6 G/DL — SIGNIFICANT CHANGE UP (ref 3.6–5.5)
ALBUMIN/GLOB SERPL ELPH: 1.1 RATIO — SIGNIFICANT CHANGE UP
ALPHA1 GLOB 24H MFR UR ELPH: 12.3 % — SIGNIFICANT CHANGE UP
ALPHA1 GLOB SERPL ELPH-MCNC: 0.3 G/DL — SIGNIFICANT CHANGE UP (ref 0.1–0.4)
ALPHA2 GLOB 24H MFR UR ELPH: 13.4 % — SIGNIFICANT CHANGE UP
ALPHA2 GLOB SERPL ELPH-MCNC: 0.7 G/DL — SIGNIFICANT CHANGE UP (ref 0.5–1)
ANION GAP SERPL CALC-SCNC: 13 MMOL/L — SIGNIFICANT CHANGE UP (ref 5–17)
APTT BLD: 71.6 SEC — HIGH (ref 27.5–37.4)
B-GLOBULIN 24H MFR UR ELPH: 9.8 % — SIGNIFICANT CHANGE UP
B-GLOBULIN SERPL ELPH-MCNC: 0.9 G/DL — SIGNIFICANT CHANGE UP (ref 0.5–1)
BUN SERPL-MCNC: 54 MG/DL — HIGH (ref 7–23)
CALCIUM SERPL-MCNC: 9.3 MG/DL — SIGNIFICANT CHANGE UP (ref 8.4–10.5)
CHLORIDE SERPL-SCNC: 98 MMOL/L — SIGNIFICANT CHANGE UP (ref 96–108)
CO2 SERPL-SCNC: 21 MMOL/L — LOW (ref 22–31)
COLLECT DURATION TIME UR: 24 HR — SIGNIFICANT CHANGE UP
CREAT SERPL-MCNC: 2.3 MG/DL — HIGH (ref 0.5–1.3)
CULTURE RESULTS: SIGNIFICANT CHANGE UP
CULTURE RESULTS: SIGNIFICANT CHANGE UP
GAMMA GLOBULIN: 1.5 G/DL — SIGNIFICANT CHANGE UP (ref 0.6–1.6)
GBM IGG SER-ACNC: <0.2 U — SIGNIFICANT CHANGE UP
GLUCOSE BLDC GLUCOMTR-MCNC: 153 MG/DL — HIGH (ref 70–99)
GLUCOSE BLDC GLUCOMTR-MCNC: 247 MG/DL — HIGH (ref 70–99)
GLUCOSE BLDC GLUCOMTR-MCNC: 279 MG/DL — HIGH (ref 70–99)
GLUCOSE BLDC GLUCOMTR-MCNC: 96 MG/DL — SIGNIFICANT CHANGE UP (ref 70–99)
GLUCOSE SERPL-MCNC: 110 MG/DL — HIGH (ref 70–99)
HCT VFR BLD CALC: 31.9 % — LOW (ref 34.5–45)
HGB BLD-MCNC: 10.9 G/DL — LOW (ref 11.5–15.5)
INR BLD: 1.03 RATIO — SIGNIFICANT CHANGE UP (ref 0.88–1.16)
INTERPRETATION 24H UR IFE-IMP: SIGNIFICANT CHANGE UP
M PROTEIN 24H UR ELPH-MRATE: SIGNIFICANT CHANGE UP
MCHC RBC-ENTMCNC: 29.7 PG — SIGNIFICANT CHANGE UP (ref 27–34)
MCHC RBC-ENTMCNC: 34.2 GM/DL — SIGNIFICANT CHANGE UP (ref 32–36)
MCV RBC AUTO: 86.9 FL — SIGNIFICANT CHANGE UP (ref 80–100)
PLATELET # BLD AUTO: 311 K/UL — SIGNIFICANT CHANGE UP (ref 150–400)
POTASSIUM SERPL-MCNC: 4.7 MMOL/L — SIGNIFICANT CHANGE UP (ref 3.5–5.3)
POTASSIUM SERPL-SCNC: 4.7 MMOL/L — SIGNIFICANT CHANGE UP (ref 3.5–5.3)
PROT ?TM UR-MCNC: 44 MG/DL — HIGH (ref 0–12)
PROT PATTERN 24H UR ELPH-IMP: SIGNIFICANT CHANGE UP
PROT PATTERN SERPL ELPH-IMP: SIGNIFICANT CHANGE UP
PROTEIN QUANT CALC, URINE: 1232 MG/24 H — HIGH (ref 50–100)
PROTHROM AB SERPL-ACNC: 11.7 SEC — SIGNIFICANT CHANGE UP (ref 10–13.1)
RBC # BLD: 3.67 M/UL — LOW (ref 3.8–5.2)
RBC # FLD: 13.5 % — SIGNIFICANT CHANGE UP (ref 10.3–14.5)
SODIUM SERPL-SCNC: 132 MMOL/L — LOW (ref 135–145)
SPECIMEN SOURCE: SIGNIFICANT CHANGE UP
SPECIMEN SOURCE: SIGNIFICANT CHANGE UP
TOTAL VOLUME - 24 HOUR: 2800 ML — SIGNIFICANT CHANGE UP
URINE CREATININE CALCULATION: 0.6 G/24 H — LOW (ref 0.8–1.8)
WBC # BLD: 8.86 K/UL — SIGNIFICANT CHANGE UP (ref 3.8–10.5)
WBC # FLD AUTO: 8.86 K/UL — SIGNIFICANT CHANGE UP (ref 3.8–10.5)

## 2017-11-06 PROCEDURE — 99233 SBSQ HOSP IP/OBS HIGH 50: CPT

## 2017-11-06 PROCEDURE — 99233 SBSQ HOSP IP/OBS HIGH 50: CPT | Mod: GC

## 2017-11-06 PROCEDURE — 99232 SBSQ HOSP IP/OBS MODERATE 35: CPT

## 2017-11-06 RX ADMIN — HEPARIN SODIUM 900 UNIT(S)/HR: 5000 INJECTION INTRAVENOUS; SUBCUTANEOUS at 08:16

## 2017-11-06 RX ADMIN — Medication 75 MICROGRAM(S): at 06:06

## 2017-11-06 RX ADMIN — Medication 12.5 MILLIGRAM(S): at 11:03

## 2017-11-06 RX ADMIN — Medication 0.5 MILLIGRAM(S): at 06:00

## 2017-11-06 RX ADMIN — AMIODARONE HYDROCHLORIDE 200 MILLIGRAM(S): 400 TABLET ORAL at 06:06

## 2017-11-06 RX ADMIN — LEVALBUTEROL 0.63 MILLIGRAM(S): 1.25 SOLUTION, CONCENTRATE RESPIRATORY (INHALATION) at 22:03

## 2017-11-06 RX ADMIN — INSULIN GLARGINE 7 UNIT(S): 100 INJECTION, SOLUTION SUBCUTANEOUS at 22:03

## 2017-11-06 RX ADMIN — LEVALBUTEROL 0.63 MILLIGRAM(S): 1.25 SOLUTION, CONCENTRATE RESPIRATORY (INHALATION) at 06:00

## 2017-11-06 RX ADMIN — Medication 20 MILLIGRAM(S): at 06:05

## 2017-11-06 RX ADMIN — LEVALBUTEROL 0.63 MILLIGRAM(S): 1.25 SOLUTION, CONCENTRATE RESPIRATORY (INHALATION) at 15:06

## 2017-11-06 RX ADMIN — Medication 0.5 MILLIGRAM(S): at 17:18

## 2017-11-06 RX ADMIN — Medication 12.5 MILLIGRAM(S): at 17:18

## 2017-11-06 RX ADMIN — Medication 3: at 17:18

## 2017-11-06 RX ADMIN — Medication 5 MILLIGRAM(S): at 22:03

## 2017-11-06 RX ADMIN — Medication 1: at 12:46

## 2017-11-06 RX ADMIN — AMLODIPINE BESYLATE 10 MILLIGRAM(S): 2.5 TABLET ORAL at 06:06

## 2017-11-06 NOTE — PROGRESS NOTE ADULT - SUBJECTIVE AND OBJECTIVE BOX
[facility] DIVISION OF KIDNEY DISEASES AND HYPERTENSION -- FOLLOW UP NOTE  --------------------------------------------------------------------------------  Chief Complaint:  Acute kidney injury    24 hour events/subjective:  patient reports that breathing has significantly improved since admission, has no complaints at this time        PAST HISTORY  --------------------------------------------------------------------------------  No significant changes to PMH, PSH, FHx, SHx, unless otherwise noted    ALLERGIES & MEDICATIONS  --------------------------------------------------------------------------------  Allergies    Iron 100 (Rash)    Intolerances      Standing Inpatient Medications  amiodarone    Tablet 200 milliGRAM(s) Oral daily  amLODIPine   Tablet 10 milliGRAM(s) Oral daily  bisacodyl 5 milliGRAM(s) Oral at bedtime  buDESOnide   0.5 milliGRAM(s) Respule 0.5 milliGRAM(s) Inhalation two times a day  dextrose 5%. 1000 milliLiter(s) IV Continuous <Continuous>  dextrose 50% Injectable 12.5 Gram(s) IV Push once  dextrose 50% Injectable 25 Gram(s) IV Push once  dextrose 50% Injectable 25 Gram(s) IV Push once  heparin  Infusion.  Unit(s)/Hr IV Continuous <Continuous>  insulin glargine Injectable (LANTUS) 7 Unit(s) SubCutaneous at bedtime  insulin lispro (HumaLOG) corrective regimen sliding scale   SubCutaneous three times a day before meals  insulin lispro (HumaLOG) corrective regimen sliding scale   SubCutaneous at bedtime  levalbuterol Inhalation 0.63 milliGRAM(s) Inhalation every 8 hours  levothyroxine 75 MICROGram(s) Oral daily  metoprolol     tartrate 12.5 milliGRAM(s) Oral two times a day    PRN Inpatient Medications  acetaminophen   Tablet. 650 milliGRAM(s) Oral every 6 hours PRN  dextrose Gel 1 Dose(s) Oral once PRN  glucagon  Injectable 1 milliGRAM(s) IntraMuscular once PRN  heparin  Injectable 6000 Unit(s) IV Push every 6 hours PRN  heparin  Injectable 3000 Unit(s) IV Push every 6 hours PRN      REVIEW OF SYSTEMS  --------------------------------------------------------------------------------  Gen: No fevers/chills  Skin: No rashes  Head/Eyes/Ears/Mouth: No headache  Respiratory: No dyspnea  CV: No chest pain, no dyspnea at rest  GI: No abdominal pain  : No dysuria  MSK: No edema  Neuro: No dizziness/lightheadedness    VITALS/PHYSICAL EXAM  --------------------------------------------------------------------------------  T(C): 36.6 (11-06-17 @ 11:06), Max: 36.6 (11-05-17 @ 21:26)  HR: 59 (11-06-17 @ 11:06) (54 - 59)  BP: 145/63 (11-06-17 @ 11:06) (136/58 - 161/56)  RR: 16 (11-06-17 @ 11:06) (16 - 20)  SpO2: 99% (11-06-17 @ 11:06) (99% - 100%)  Wt(kg): --        11-05-17 @ 07:01  -  11-06-17 @ 07:00  --------------------------------------------------------  IN: 408 mL / OUT: 300 mL / NET: 108 mL      Physical Exam:  	Gen: NAD, well-appearing  	HEENT: MMM  	Pulm: CTA B/L  	CV: RRR, S1S2; no rub  	Abd: +BS, soft, nontender/nondistended  	: No suprapubic tenderness  	UE:  no edema  	LE:  mild nonpitting edema bilaterally  	Neuro: No focal deficits  	Psych: Normal affect and mood  	Skin: Warm    LABS/STUDIES  --------------------------------------------------------------------------------              10.9   8.86  >-----------<  311      [11-06-17 @ 07:21]              31.9     132  |  98  |  54  ----------------------------<  110      [11-06-17 @ 08:30]  4.7   |  21  |  2.30        Ca     9.3     [11-06-17 @ 08:30]      PT/INR: PT 11.7 , INR 1.03       [11-06-17 @ 07:13]  PTT: 71.6       [11-06-17 @ 07:13]    Creatinine Trend:  SCr 2.30 [11-06 @ 08:30]  SCr 2.30 [11-05 @ 08:30]  SCr 2.39 [11-04 @ 09:04]  SCr 2.74 [11-03 @ 07:52]  SCr 3.06 [11-02 @ 06:40]

## 2017-11-06 NOTE — PROGRESS NOTE ADULT - ATTENDING COMMENTS
seen and examined with fellow. I agree with H & P, A & P.  AF to SR with no pause.  Continue amio 200 mg daily.  No PPM at this time.

## 2017-11-06 NOTE — PROGRESS NOTE ADULT - ATTENDING COMMENTS
Less SOB,overall improved  1.  ARF--non oliguric, improving, no HD  2.  Proteinuria--check P/Cr to define additional testing  3.  HypoNa+--water restriction  4.  Volume overload--greatly improved.  Likely close to optimal

## 2017-11-06 NOTE — PROGRESS NOTE ADULT - PROBLEM SELECTOR PLAN 4
Baseline Cr around 2.0, Renal plan noted. CKD is likely from DM  - Cr now stable at 2.3  - Holding diuretics  - Serology for vasculitis, infectious, MM in progress

## 2017-11-06 NOTE — PROGRESS NOTE ADULT - PROBLEM SELECTOR PLAN 1
- pt w/ likely SAM on CKD, with some obstructive component, as well as concern for cardio-renal component; will r/o GN  -concerned about previous value of urine protein / creatinine ratio, however tests were done with rising creatinine and very low urine creatinine.  Would repeat urine protein / creatinine ratio now that creatinine has stabilized.  -GN workup unremarkable, FADIA elevated by complement levels are WNL  -monitor renal function

## 2017-11-06 NOTE — PROGRESS NOTE ADULT - PROBLEM SELECTOR PLAN 3
-appropriately on a sodium restricted diet  -sodium of 132 today, no acute intervention required, would consider to monitor

## 2017-11-06 NOTE — PROGRESS NOTE ADULT - PROBLEM SELECTOR PLAN 2
Currently with no signs of edema or fluid overload  - Patient will go for nuclear exercise stress test  - Formal Echo (11/2) with EF>55, moderate diastolic dysfunction, moderate AS, mild MR, MS, and AR

## 2017-11-06 NOTE — PROGRESS NOTE ADULT - SUBJECTIVE AND OBJECTIVE BOX
Interval events:  Converted from afib to NSR yesterday  Currently NSR 50-60s    Review Of Systems:  Constitutional: [ ] Fever [ ] Chills [ ] Fatigue [ ] Weight change   HEENT: [ ] Blurred vision [ ] Eye Pain [ ] Headache [ ] Runny nose [ ] Sore Throat   Respiratory: [ ] Cough [ ] Wheezing [ ] Shortness of breath  Cardiovascular: [ ] Chest Pain [ ] Palpitations [ ] POZO [ ] PND [ ] Orthopnea  Gastrointestinal: [ ] Abdominal Pain [ ] Diarrhea [ ] Constipation [ ] Hemorrhoids [ ] Nausea [ ] Vomiting  Genitourinary: [ ] Nocturia [ ] Dysuria [ ] Incontinence  Extremities: [ ] Swelling [ ] Joint Pain  Neurologic: [ ] Focal deficit [ ] Paresthesias [ ] Syncope  Lymphatic: [ ] Swelling [ ] Lymphadenopathy   Skin: [ ] Rash [ ] Ecchymoses [ ] Wounds [ ] Lesions  Psychiatry: [ ] Depression [ ] Suicidal/Homicidal Ideation [ ] Anxiety [ ] Sleep Disturbances  [ ] 10 point review of systems is otherwise negative except as mentioned above            [ ]Unable to obtain    Medications:  acetaminophen   Tablet. 650 milliGRAM(s) Oral every 6 hours PRN  amiodarone    Tablet 200 milliGRAM(s) Oral daily  amLODIPine   Tablet 10 milliGRAM(s) Oral daily  bisacodyl 5 milliGRAM(s) Oral at bedtime  buDESOnide   0.5 milliGRAM(s) Respule 0.5 milliGRAM(s) Inhalation two times a day  dextrose 5%. 1000 milliLiter(s) IV Continuous <Continuous>  dextrose 50% Injectable 12.5 Gram(s) IV Push once  dextrose 50% Injectable 25 Gram(s) IV Push once  dextrose 50% Injectable 25 Gram(s) IV Push once  dextrose Gel 1 Dose(s) Oral once PRN  glucagon  Injectable 1 milliGRAM(s) IntraMuscular once PRN  heparin  Infusion.  Unit(s)/Hr IV Continuous <Continuous>  heparin  Injectable 6000 Unit(s) IV Push every 6 hours PRN  heparin  Injectable 3000 Unit(s) IV Push every 6 hours PRN  insulin glargine Injectable (LANTUS) 7 Unit(s) SubCutaneous at bedtime  insulin lispro (HumaLOG) corrective regimen sliding scale   SubCutaneous three times a day before meals  insulin lispro (HumaLOG) corrective regimen sliding scale   SubCutaneous at bedtime  levalbuterol Inhalation 0.63 milliGRAM(s) Inhalation every 8 hours  levothyroxine 75 MICROGram(s) Oral daily  metoprolol     tartrate 12.5 milliGRAM(s) Oral two times a day    PMH/PSH/FH/SH: [ ] Unchanged  Vitals:  T(C): 36.5 (11-06-17 @ 04:54), Max: 36.6 (11-05-17 @ 21:26)  HR: 55 (11-06-17 @ 04:54) (54 - 87)  BP: 143/67 (11-06-17 @ 04:54) (136/58 - 161/56)  BP(mean): --  RR: 19 (11-06-17 @ 04:54) (18 - 20)  SpO2: 100% (11-06-17 @ 04:54) (99% - 100%)  Wt(kg): --  Daily     Daily   I&O's Summary    05 Nov 2017 07:01  -  06 Nov 2017 07:00  --------------------------------------------------------  IN: 408 mL / OUT: 300 mL / NET: 108 mL        Physical Exam:  Appearance:  Normal, NAD  Eyes: PERRL, EOMI  HENT: Normal oral [place] NC/AT  Cardiovascular: S1, S2, RRR, No m/r/g appreciated, No edema, no elevation in JVP  Procedural Access Site: No hematoma, Non-tender to palpation, 2+ pulse , No bruit, No Ecchymosis  Respiratory: Clear to auscultation bilaterally  Gastrointestinal: Soft, Non-tender, Non-distended, BS+  Musculoskeletal:  No clubbing, No joint deformity   Neurologic: Non-focal  Lymphatic: No lymphadenopathy  Psychiatry: AAOx3, Mood & affect appropriate  Skin: No rashes, No ecchymoses, No cyanosis    Labs:                        10.9   8.86  )-----------( 311      ( 06 Nov 2017 07:21 )             31.9     11-05    140  |  101  |  52<H>  ----------------------------<  86  5.2   |  18<L>  |  2.30<H>    Ca    9.7      05 Nov 2017 08:30    TPro  7.0  /  Alb  x   /  TBili  x   /  DBili  x   /  AST  x   /  ALT  x   /  AlkPhos  x   11-04    PT/INR - ( 06 Nov 2017 07:13 )   PT: 11.7 sec;   INR: 1.03 ratio         PTT - ( 06 Nov 2017 07:13 )  PTT:71.6 sec      Serum Pro-Brain Natriuretic Peptide: [id number] pg/mL (10-31 @ 20:34)    Interpretation of Telemetry: sinus 50-60s, converted yesterday morning

## 2017-11-06 NOTE — PROGRESS NOTE ADULT - ASSESSMENT
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 Patient has converted from AFib to NSR.    -- ischemia evaluation  -- continue tele, monitoring for pauses  -- continue amiodarone 200 mg daily  -- continue metoprolol 12.5 mg BID  -- heparin gtt reasonable for AC in setting of afib with ultimate plan to convert to coumadin    Hunter Blandon MD

## 2017-11-06 NOTE — PROGRESS NOTE ADULT - SUBJECTIVE AND OBJECTIVE BOX
[employer] Intern: Guillermo Narayanan  Spectralink: [phone]  Pager: [phone]      Patient is a 85y old  Female who presents with a chief complaint of SOB, n/v (01 Nov 2017 02:39)      INTERVAL HPI/OVERNIGHT EVENTS:        REVIEW OF SYSTEMS:  CONSTITUTIONAL: No fever, weight loss, or fatigue  EYES: No eye pain, visual disturbances, or discharge  ENMT:  No difficulty hearing, tinnitus, vertigo; No sinus or throat pain  NECK: No pain or stiffness  BREASTS: No pain, masses, or nipple discharge  RESPIRATORY: No cough, wheezing, chills or hemoptysis; No shortness of breath  CARDIOVASCULAR: No chest pain, palpitations, dizziness, or leg swelling  GASTROINTESTINAL: No abdominal or epigastric pain. No nausea, vomiting, or hematemesis; No diarrhea or constipation. No melena or hematochezia.  GENITOURINARY: No dysuria, frequency, hematuria, or incontinence  NEUROLOGICAL: No headaches, memory loss, loss of strength, numbness, or tremors  SKIN: No itching, burning, rashes, or lesions   LYMPH NODES: No enlarged glands  ENDOCRINE: No heat or cold intolerance; No hair loss  MUSCULOSKELETAL: No joint pain or swelling; No muscle, back, or extremity pain  PSYCHIATRIC: No depression, anxiety, mood swings, or difficulty sleeping  HEME/LYMPH: No easy bruising, or bleeding gums  ALLERY AND IMMUNOLOGIC: No hives or eczema    T(C): 36.5 (11-06-17 @ 04:54), Max: 36.6 (11-05-17 @ 21:26)  HR: 55 (11-06-17 @ 04:54) (54 - 87)  BP: 143/67 (11-06-17 @ 04:54) (136/58 - 161/56)  RR: 19 (11-06-17 @ 04:54) (18 - 20)  SpO2: 100% (11-06-17 @ 04:54) (99% - 100%)  Wt(kg): --Vital Signs Last 24 Hrs  T(C): 36.5 (06 Nov 2017 04:54), Max: 36.6 (05 Nov 2017 21:26)  T(F): 97.7 (06 Nov 2017 04:54), Max: 97.8 (05 Nov 2017 21:26)  HR: 55 (06 Nov 2017 04:54) (54 - 87)  BP: 143/67 (06 Nov 2017 04:54) (136/58 - 161/56)  BP(mean): --  RR: 19 (06 Nov 2017 04:54) (18 - 20)  SpO2: 100% (06 Nov 2017 04:54) (99% - 100%)    PHYSICAL EXAM:  GENERAL: NAD, well-groomed, well-developed  HEAD:  Atraumatic, Normocephalic  EYES: EOMI, PERRLA, conjunctiva and sclera clear  ENMT: No tonsillar erythema, exudates, or enlargement; Moist mucous membranes, Good dentition, No lesions  NECK: Supple, No JVD, Normal thyroid  NERVOUS SYSTEM:  Alert & Oriented X3, Good concentration; Motor Strength 5/5 B/L upper and lower extremities; DTRs 2+ intact and symmetric  CHEST/LUNG: Clear to percussion bilaterally; No rales, rhonchi, wheezing, or rubs  HEART: Regular rate and rhythm; No murmurs, rubs, or gallops  ABDOMEN: Soft, Nontender, Nondistended; Bowel sounds present  EXTREMITIES:  2+ Peripheral Pulses, No clubbing, cyanosis, or edema  LYMPH: No lymphadenopathy noted  SKIN: No rashes or lesions    Consultant(s) Notes Reviewed:  [x ] YES  [ ] NO  Care Discussed with Consultants/Other Providers [ x] YES  [ ] NO    LABS:                        12.0   8.66  )-----------( 369      ( 05 Nov 2017 08:21 )             35.7     11-05    140  |  101  |  52<H>  ----------------------------<  86  5.2   |  18<L>  |  2.30<H>    Ca    9.7      05 Nov 2017 08:30    TPro  7.0  /  Alb  x   /  TBili  x   /  DBili  x   /  AST  x   /  ALT  x   /  AlkPhos  x   11-04    PT/INR - ( 05 Nov 2017 08:14 )   PT: 11.5 sec;   INR: 1.02 ratio         PTT - ( 05 Nov 2017 08:14 )  PTT:59.7 sec    CAPILLARY BLOOD GLUCOSE  212 (05 Nov 2017 12:00)  93 (05 Nov 2017 07:25)      POCT Blood Glucose.: 209 mg/dL (05 Nov 2017 21:22)  POCT Blood Glucose.: 282 mg/dL (05 Nov 2017 16:33)  POCT Blood Glucose.: 212 mg/dL (05 Nov 2017 12:02)  POCT Blood Glucose.: 93 mg/dL (05 Nov 2017 07:26)            RADIOLOGY & ADDITIONAL TESTS:    Imaging Personally Reviewed:  [ ] YES  [ ] NO [employer] Intern: Guillermo Narayanan  Spectralink: [phone]  Pager: [phone]      Patient is a 85y old  Female who presents with a chief complaint of SOB, n/v (01 Nov 2017 02:39)      INTERVAL HPI/OVERNIGHT EVENTS: Overnight, no acute events. Yesterday, patient converted from atrial fibrillation to sinus rhythm. Patient with HR 55-60 on telemetry. Patient was for cardioversion today, but this was cancelled as patient is now in sinus rhythm. Patient examined at bedside this AM. Patient denies any discomfort currently. Denies fevers/chills, nausea/vomiting, chest pain, shortness of breath, palpitations, diaphoresis, loss of appetite, constipation/diarrhea.    T(C): 36.5 (11-06-17 @ 04:54), Max: 36.6 (11-05-17 @ 21:26)  HR: 55 (11-06-17 @ 04:54) (54 - 87)  BP: 143/67 (11-06-17 @ 04:54) (136/58 - 161/56)  RR: 19 (11-06-17 @ 04:54) (18 - 20)  SpO2: 100% (11-06-17 @ 04:54) (99% - 100%)  Wt(kg): --Vital Signs Last 24 Hrs  T(C): 36.5 (06 Nov 2017 04:54), Max: 36.6 (05 Nov 2017 21:26)  T(F): 97.7 (06 Nov 2017 04:54), Max: 97.8 (05 Nov 2017 21:26)  HR: 55 (06 Nov 2017 04:54) (54 - 87)  BP: 143/67 (06 Nov 2017 04:54) (136/58 - 161/56)  BP(mean): --  RR: 19 (06 Nov 2017 04:54) (18 - 20)  SpO2: 100% (06 Nov 2017 04:54) (99% - 100%)    PHYSICAL EXAM:  Appearance: AOx3, NAD, lying in bed comfortably  HEENT: MMM, PERRL, EOMI, NC/AT  Cardiovascular: normal S1 and S2, RRR, no m/r/g, no edema, normal JVP  Respiratory: Clear to auscultation bilaterally, no wheezes  Gastrointestinal: Soft, non-tender, non-distended, BS+  Musculoskeletal: No clubbing, no joint deformity   Skin: No rashes, no ecchymoses, no cyanosis    Consultant(s) Notes Reviewed:  [x ] YES  [ ] NO  Care Discussed with Consultants/Other Providers [ x] YES  [ ] NO    LABS:                        12.0   8.66  )-----------( 369      ( 05 Nov 2017 08:21 )             35.7     11-05    140  |  101  |  52<H>  ----------------------------<  86  5.2   |  18<L>  |  2.30<H>    Ca    9.7      05 Nov 2017 08:30    TPro  7.0  /  Alb  x   /  TBili  x   /  DBili  x   /  AST  x   /  ALT  x   /  AlkPhos  x   11-04    PT/INR - ( 05 Nov 2017 08:14 )   PT: 11.5 sec;   INR: 1.02 ratio         PTT - ( 05 Nov 2017 08:14 )  PTT:59.7 sec    CAPILLARY BLOOD GLUCOSE  212 (05 Nov 2017 12:00)  93 (05 Nov 2017 07:25)      POCT Blood Glucose.: 209 mg/dL (05 Nov 2017 21:22)  POCT Blood Glucose.: 282 mg/dL (05 Nov 2017 16:33)  POCT Blood Glucose.: 212 mg/dL (05 Nov 2017 12:02)  POCT Blood Glucose.: 93 mg/dL (05 Nov 2017 07:26)            RADIOLOGY & ADDITIONAL TESTS:    Imaging Personally Reviewed:  [ ] YES  [ ] NO

## 2017-11-06 NOTE — PROGRESS NOTE ADULT - PROBLEM SELECTOR PLAN 3
No wheeze, cough this AM  - finished 3 day course of 20mg oral prednisone  - c/w Pulmicort 0.5mg BID  - c/w xopinex 0.63 q8h

## 2017-11-06 NOTE — PROGRESS NOTE ADULT - PROBLEM SELECTOR PLAN 1
Patient converted to sinus on 11/5 with 1.7s interval pause  - no plan for cardioversion anymore, no plan for pacemaker currently  - c/w metoprolol 12.5mg bid, amiodarone 200 mg daily  - c/w heparin gtt for anticoagulation, will bridge to coumadin as NOAC is not a good option for this patient given age and kidney function  - cards/EP eval and f/u noted

## 2017-11-06 NOTE — PROGRESS NOTE ADULT - ATTENDING COMMENTS
Seen, examined with R1/R2 resident  - No SOB, afebrile, no wheezing.  - Has been in sinus armand 55s in rest, had pause 1.7sec yesterday. will c/e Tele  spoke to Dr Herron (EP), will do stress test, no cardioversion given it's sinus  - c/w IV heparin gtt, metoprolol 12.5mg bid, amiodarone 200mg daily  - Spoke to Card- Dr Falcon  ** Spoke to Dtr-in-law

## 2017-11-07 LAB
ANION GAP SERPL CALC-SCNC: 13 MMOL/L — SIGNIFICANT CHANGE UP (ref 5–17)
APTT BLD: 60.9 SEC — HIGH (ref 27.5–37.4)
BUN SERPL-MCNC: 50 MG/DL — HIGH (ref 7–23)
CALCIUM SERPL-MCNC: 9 MG/DL — SIGNIFICANT CHANGE UP (ref 8.4–10.5)
CHLORIDE SERPL-SCNC: 99 MMOL/L — SIGNIFICANT CHANGE UP (ref 96–108)
CO2 SERPL-SCNC: 21 MMOL/L — LOW (ref 22–31)
CREAT SERPL-MCNC: 2.24 MG/DL — HIGH (ref 0.5–1.3)
DSDNA AB SER-ACNC: 134 IU/ML — HIGH
GLUCOSE BLDC GLUCOMTR-MCNC: 161 MG/DL — HIGH (ref 70–99)
GLUCOSE BLDC GLUCOMTR-MCNC: 192 MG/DL — HIGH (ref 70–99)
GLUCOSE BLDC GLUCOMTR-MCNC: 206 MG/DL — HIGH (ref 70–99)
GLUCOSE BLDC GLUCOMTR-MCNC: 86 MG/DL — SIGNIFICANT CHANGE UP (ref 70–99)
GLUCOSE SERPL-MCNC: 108 MG/DL — HIGH (ref 70–99)
HCT VFR BLD CALC: 31 % — LOW (ref 34.5–45)
HGB BLD-MCNC: 10.5 G/DL — LOW (ref 11.5–15.5)
MCHC RBC-ENTMCNC: 29.4 PG — SIGNIFICANT CHANGE UP (ref 27–34)
MCHC RBC-ENTMCNC: 33.9 GM/DL — SIGNIFICANT CHANGE UP (ref 32–36)
MCV RBC AUTO: 86.8 FL — SIGNIFICANT CHANGE UP (ref 80–100)
PLATELET # BLD AUTO: 324 K/UL — SIGNIFICANT CHANGE UP (ref 150–400)
POTASSIUM SERPL-MCNC: 4.8 MMOL/L — SIGNIFICANT CHANGE UP (ref 3.5–5.3)
POTASSIUM SERPL-SCNC: 4.8 MMOL/L — SIGNIFICANT CHANGE UP (ref 3.5–5.3)
RBC # BLD: 3.57 M/UL — LOW (ref 3.8–5.2)
RBC # FLD: 13.3 % — SIGNIFICANT CHANGE UP (ref 10.3–14.5)
SODIUM SERPL-SCNC: 133 MMOL/L — LOW (ref 135–145)
WBC # BLD: 8.65 K/UL — SIGNIFICANT CHANGE UP (ref 3.8–10.5)
WBC # FLD AUTO: 8.65 K/UL — SIGNIFICANT CHANGE UP (ref 3.8–10.5)

## 2017-11-07 PROCEDURE — 78452 HT MUSCLE IMAGE SPECT MULT: CPT | Mod: 26

## 2017-11-07 PROCEDURE — 93016 CV STRESS TEST SUPVJ ONLY: CPT

## 2017-11-07 PROCEDURE — 99232 SBSQ HOSP IP/OBS MODERATE 35: CPT

## 2017-11-07 PROCEDURE — 99233 SBSQ HOSP IP/OBS HIGH 50: CPT | Mod: GC

## 2017-11-07 PROCEDURE — 93018 CV STRESS TEST I&R ONLY: CPT

## 2017-11-07 PROCEDURE — 99232 SBSQ HOSP IP/OBS MODERATE 35: CPT | Mod: GC

## 2017-11-07 RX ORDER — WARFARIN SODIUM 2.5 MG/1
3 TABLET ORAL ONCE
Qty: 0 | Refills: 0 | Status: COMPLETED | OUTPATIENT
Start: 2017-11-07 | End: 2017-11-07

## 2017-11-07 RX ORDER — AMIODARONE HYDROCHLORIDE 400 MG/1
1 TABLET ORAL
Qty: 30 | Refills: 0 | OUTPATIENT
Start: 2017-11-07 | End: 2017-12-07

## 2017-11-07 RX ORDER — METOPROLOL TARTRATE 50 MG
1 TABLET ORAL
Qty: 0 | Refills: 0 | COMMUNITY

## 2017-11-07 RX ORDER — AMIODARONE HYDROCHLORIDE 400 MG/1
1 TABLET ORAL
Qty: 0 | Refills: 0 | COMMUNITY

## 2017-11-07 RX ORDER — WARFARIN SODIUM 2.5 MG/1
1 TABLET ORAL
Qty: 7 | Refills: 0 | OUTPATIENT
Start: 2017-11-07 | End: 2017-11-14

## 2017-11-07 RX ORDER — RIVAROXABAN 15 MG-20MG
1 KIT ORAL
Qty: 0 | Refills: 0 | COMMUNITY

## 2017-11-07 RX ORDER — METOPROLOL TARTRATE 50 MG
0.5 TABLET ORAL
Qty: 30 | Refills: 0 | OUTPATIENT
Start: 2017-11-07 | End: 2017-12-07

## 2017-11-07 RX ADMIN — Medication 10 MILLIGRAM(S): at 06:24

## 2017-11-07 RX ADMIN — Medication 0.5 MILLIGRAM(S): at 06:15

## 2017-11-07 RX ADMIN — LEVALBUTEROL 0.63 MILLIGRAM(S): 1.25 SOLUTION, CONCENTRATE RESPIRATORY (INHALATION) at 22:06

## 2017-11-07 RX ADMIN — Medication 1: at 12:37

## 2017-11-07 RX ADMIN — Medication 0.5 MILLIGRAM(S): at 18:06

## 2017-11-07 RX ADMIN — Medication: at 17:58

## 2017-11-07 RX ADMIN — Medication 5 MILLIGRAM(S): at 22:06

## 2017-11-07 RX ADMIN — AMIODARONE HYDROCHLORIDE 200 MILLIGRAM(S): 400 TABLET ORAL at 06:15

## 2017-11-07 RX ADMIN — LEVALBUTEROL 0.63 MILLIGRAM(S): 1.25 SOLUTION, CONCENTRATE RESPIRATORY (INHALATION) at 06:15

## 2017-11-07 RX ADMIN — LEVALBUTEROL 0.63 MILLIGRAM(S): 1.25 SOLUTION, CONCENTRATE RESPIRATORY (INHALATION) at 13:04

## 2017-11-07 RX ADMIN — WARFARIN SODIUM 3 MILLIGRAM(S): 2.5 TABLET ORAL at 18:05

## 2017-11-07 RX ADMIN — Medication 12.5 MILLIGRAM(S): at 18:02

## 2017-11-07 RX ADMIN — AMLODIPINE BESYLATE 10 MILLIGRAM(S): 2.5 TABLET ORAL at 06:15

## 2017-11-07 RX ADMIN — HEPARIN SODIUM 900 UNIT(S)/HR: 5000 INJECTION INTRAVENOUS; SUBCUTANEOUS at 08:17

## 2017-11-07 RX ADMIN — Medication 75 MICROGRAM(S): at 06:14

## 2017-11-07 RX ADMIN — INSULIN GLARGINE 7 UNIT(S): 100 INJECTION, SOLUTION SUBCUTANEOUS at 22:06

## 2017-11-07 NOTE — PROGRESS NOTE ADULT - ATTENDING COMMENTS
Seen, examined in am with R1/R2 resident  - No pauses of HB on tele but sinus rhythm, no chest pain  - EP plans for stress test today  - c/w amiodarone, low dose BB, IV heparin gtt  - bronchodilators, tapering PO prednisone, inhaled sterois  - d/c planning with cardiology clearance  ** spoke to Dtr-in-law Seen, examined in am with R1/R2 resident  - No pauses of HB on tele but sinus rhythm, no chest pain  - EP plans for stress test today  - c/w amiodarone, low dose BB, IV heparin gtt  - bronchodilators, tapering PO prednisone, inhaled steroid  - d/c planning with cardiology clearance  ** spoke to Dtr-in-law

## 2017-11-07 NOTE — DISCHARGE NOTE ADULT - OTHER SIGNIFICANT FINDINGS
IMPRESSIONS:Abnormal Study  * Chest Pain: No chest pain with administration of  Regadenoson.  * Symptom: chest pressure, SOB, abdominal pressure.  * HR Response: Appropriate.  * BP Response: Appropriate.  * Heart Rhythm: Sinus Bradycardia - 55 BPM.  * Conduction defects: right bundle branch block, LAFB.  * Baseline ECG: Nonspecific ST-T wave abnormality.  Poor R  wave progression.  * ECG Changes: No significant ischemic ST segment changes  beyond baseline abnormalities.  * Arrhythmia: None.  * The left ventricle was mildly enlarged. There are large,  mild to moderate defects in the inferior, inferoapical,  inferolateral, and apical lateral walls that are mostly  reversible suggestive of ischemia with partial scarring.  Adjacent extracardiac tracer uptake may be contributing to  some of these defects.  * Post-stress gated wall motion analysis was performed  (LVEF = 62 %;LVEDV = 103 ml.) revealing mildly reduced  systolic thickening of the inferior, inferoapical,  inferolateral, and apical lateral walls with normal  overall left ventricular ejection fraction.  *** No previous Nuclear/Stress exam.

## 2017-11-07 NOTE — PROGRESS NOTE ADULT - ASSESSMENT
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 Patient has converted from AFib to NSR.    -- ischemia evaluation  -- continue tele, monitoring for pauses  -- continue amiodarone 200 mg daily  -- continue metoprolol 12.5 mg BID  -- heparin to coum bridging    Hunter Blandon MD

## 2017-11-07 NOTE — DISCHARGE NOTE ADULT - PATIENT PORTAL LINK FT
“You can access the [employer] Patient Portal, offered by [facility], by registering with the following website: [email or web address]”

## 2017-11-07 NOTE — PROGRESS NOTE ADULT - PROBLEM SELECTOR PLAN 1
Pt w/ likely SAM on CKD, with some obstructive component, as well as concern for cardio-renal component  -proteinuria significantly improved since admission, 1.2 grams on 24 hour collection from 4.2 while in SAM.  Non-nephrotic range proteinuria.  -renal function improving  -continue to monitor creatinine trend Pt w/ likely SAM on CKD, with some obstructive component, as well as concern for cardio-renal component  -proteinuria significantly improved since admission, 2 grams/gram cr on 24 hour collection from 4.2 while in SAM.  Non-nephrotic range proteinuria.  -renal function improving  -continue to monitor creatinine trend

## 2017-11-07 NOTE — PROGRESS NOTE ADULT - SUBJECTIVE AND OBJECTIVE BOX
[facility] DIVISION OF KIDNEY DISEASES AND HYPERTENSION -- FOLLOW UP NOTE  --------------------------------------------------------------------------------  Chief Complaint:  Acute kidney injury    24 hour events/subjective:  patient reports feeling better, was able to sleep well overnight.          PAST HISTORY  --------------------------------------------------------------------------------  No significant changes to PMH, PSH, FHx, SHx, unless otherwise noted    ALLERGIES & MEDICATIONS  --------------------------------------------------------------------------------  Allergies    Iron 100 (Rash)    Intolerances      Standing Inpatient Medications  amiodarone    Tablet 200 milliGRAM(s) Oral daily  amLODIPine   Tablet 10 milliGRAM(s) Oral daily  bisacodyl 5 milliGRAM(s) Oral at bedtime  buDESOnide   0.5 milliGRAM(s) Respule 0.5 milliGRAM(s) Inhalation two times a day  dextrose 5%. 1000 milliLiter(s) IV Continuous <Continuous>  dextrose 50% Injectable 12.5 Gram(s) IV Push once  dextrose 50% Injectable 25 Gram(s) IV Push once  dextrose 50% Injectable 25 Gram(s) IV Push once  heparin  Infusion.  Unit(s)/Hr IV Continuous <Continuous>  insulin glargine Injectable (LANTUS) 7 Unit(s) SubCutaneous at bedtime  insulin lispro (HumaLOG) corrective regimen sliding scale   SubCutaneous three times a day before meals  insulin lispro (HumaLOG) corrective regimen sliding scale   SubCutaneous at bedtime  levalbuterol Inhalation 0.63 milliGRAM(s) Inhalation every 8 hours  levothyroxine 75 MICROGram(s) Oral daily  metoprolol     tartrate 12.5 milliGRAM(s) Oral two times a day    PRN Inpatient Medications  acetaminophen   Tablet. 650 milliGRAM(s) Oral every 6 hours PRN  dextrose Gel 1 Dose(s) Oral once PRN  glucagon  Injectable 1 milliGRAM(s) IntraMuscular once PRN  heparin  Injectable 6000 Unit(s) IV Push every 6 hours PRN  heparin  Injectable 3000 Unit(s) IV Push every 6 hours PRN      REVIEW OF SYSTEMS  --------------------------------------------------------------------------------  Gen: No fevers/chills  Skin: No rashes  Head/Eyes/Ears/Mouth: No headache  Respiratory: No dyspnea  CV: No chest pain  GI:  No abdominal pain  : No dysuria  MSK: No edema  Neuro: No dizziness/lightheadedness    VITALS/PHYSICAL EXAM  --------------------------------------------------------------------------------  T(C): 36.7 (11-07-17 @ 04:08), Max: 36.8 (11-06-17 @ 20:44)  HR: 55 (11-07-17 @ 04:08) (54 - 59)  BP: 144/69 (11-07-17 @ 04:08) (144/60 - 166/70)  RR: 18 (11-07-17 @ 04:08) (16 - 18)  SpO2: 97% (11-07-17 @ 04:08) (97% - 100%)  Wt(kg): --        Physical Exam:  	Gen: NAD, well-appearing  	HEENT: MMM  	Pulm: CTA B/L  	CV: RRR, S1S2; no rub  	Abd: +BS, soft, nontender/nondistended  	: No suprapubic tenderness  	UE:  no edema  	LE:  minimal pitting edema bilaterally  	Neuro: No focal deficits  	Psych: Normal affect and mood  	Skin: Warm    LABS/STUDIES  --------------------------------------------------------------------------------              10.5   8.65  >-----------<  324      [11-07-17 @ 07:28]              31.0     133  |  99  |  50  ----------------------------<  108      [11-07-17 @ 07:22]  4.8   |  21  |  2.24        Ca     9.0     [11-07-17 @ 07:22]      PT/INR: PT 11.7 , INR 1.03       [11-06-17 @ 07:13]  PTT: 60.9       [11-07-17 @ 07:38]      Creatinine Trend:  SCr 2.24 [11-07 @ 07:22]  SCr 2.30 [11-06 @ 08:30]  SCr 2.30 [11-05 @ 08:30]  SCr 2.39 [11-04 @ 09:04]  SCr 2.74 [11-03 @ 07:52]

## 2017-11-07 NOTE — PROGRESS NOTE ADULT - PROBLEM SELECTOR PLAN 2
Currently with no signs of edema or fluid overload  - Patient will go for nuclear exercise stress test today  - Formal Echo (11/2) with EF>55, moderate diastolic dysfunction, moderate AS, mild MR, MS, and AR

## 2017-11-07 NOTE — PROGRESS NOTE ADULT - SUBJECTIVE AND OBJECTIVE BOX
[employer] Intern: Guillermo Narayanan  Spectralink: [phone]  Pager: [phone]      Patient is a 85y old  Female who presents with a chief complaint of SOB, n/v (01 Nov 2017 02:39)      INTERVAL HPI/OVERNIGHT EVENTS:        REVIEW OF SYSTEMS:  CONSTITUTIONAL: No fever, weight loss, or fatigue  EYES: No eye pain, visual disturbances, or discharge  ENMT:  No difficulty hearing, tinnitus, vertigo; No sinus or throat pain  NECK: No pain or stiffness  BREASTS: No pain, masses, or nipple discharge  RESPIRATORY: No cough, wheezing, chills or hemoptysis; No shortness of breath  CARDIOVASCULAR: No chest pain, palpitations, dizziness, or leg swelling  GASTROINTESTINAL: No abdominal or epigastric pain. No nausea, vomiting, or hematemesis; No diarrhea or constipation. No melena or hematochezia.  GENITOURINARY: No dysuria, frequency, hematuria, or incontinence  NEUROLOGICAL: No headaches, memory loss, loss of strength, numbness, or tremors  SKIN: No itching, burning, rashes, or lesions   LYMPH NODES: No enlarged glands  ENDOCRINE: No heat or cold intolerance; No hair loss  MUSCULOSKELETAL: No joint pain or swelling; No muscle, back, or extremity pain  PSYCHIATRIC: No depression, anxiety, mood swings, or difficulty sleeping  HEME/LYMPH: No easy bruising, or bleeding gums  ALLERY AND IMMUNOLOGIC: No hives or eczema    T(C): 36.7 (11-07-17 @ 04:08), Max: 36.8 (11-06-17 @ 20:44)  HR: 55 (11-07-17 @ 04:08) (54 - 59)  BP: 144/69 (11-07-17 @ 04:08) (144/60 - 166/70)  RR: 18 (11-07-17 @ 04:08) (16 - 18)  SpO2: 97% (11-07-17 @ 04:08) (97% - 100%)  Wt(kg): --Vital Signs Last 24 Hrs  T(C): 36.7 (07 Nov 2017 04:08), Max: 36.8 (06 Nov 2017 20:44)  T(F): 98 (07 Nov 2017 04:08), Max: 98.2 (06 Nov 2017 20:44)  HR: 55 (07 Nov 2017 04:08) (54 - 59)  BP: 144/69 (07 Nov 2017 04:08) (144/60 - 166/70)  BP(mean): --  RR: 18 (07 Nov 2017 04:08) (16 - 18)  SpO2: 97% (07 Nov 2017 04:08) (97% - 100%)    PHYSICAL EXAM:  GENERAL: NAD, well-groomed, well-developed  HEAD:  Atraumatic, Normocephalic  EYES: EOMI, PERRLA, conjunctiva and sclera clear  ENMT: No tonsillar erythema, exudates, or enlargement; Moist mucous membranes, Good dentition, No lesions  NECK: Supple, No JVD, Normal thyroid  NERVOUS SYSTEM:  Alert & Oriented X3, Good concentration; Motor Strength 5/5 B/L upper and lower extremities; DTRs 2+ intact and symmetric  CHEST/LUNG: Clear to percussion bilaterally; No rales, rhonchi, wheezing, or rubs  HEART: Regular rate and rhythm; No murmurs, rubs, or gallops  ABDOMEN: Soft, Nontender, Nondistended; Bowel sounds present  EXTREMITIES:  2+ Peripheral Pulses, No clubbing, cyanosis, or edema  LYMPH: No lymphadenopathy noted  SKIN: No rashes or lesions    Consultant(s) Notes Reviewed:  [x ] YES  [ ] NO  Care Discussed with Consultants/Other Providers [ x] YES  [ ] NO    LABS:                        10.9   8.86  )-----------( 311      ( 06 Nov 2017 07:21 )             31.9     11-06    132<L>  |  98  |  54<H>  ----------------------------<  110<H>  4.7   |  21<L>  |  2.30<H>    Ca    9.3      06 Nov 2017 08:30      PT/INR - ( 06 Nov 2017 07:13 )   PT: 11.7 sec;   INR: 1.03 ratio         PTT - ( 06 Nov 2017 07:13 )  PTT:71.6 sec    CAPILLARY BLOOD GLUCOSE  279 (06 Nov 2017 16:26)  153 (06 Nov 2017 11:51)  96 (06 Nov 2017 07:40)      POCT Blood Glucose.: 247 mg/dL (06 Nov 2017 20:49)  POCT Blood Glucose.: 279 mg/dL (06 Nov 2017 16:26)  POCT Blood Glucose.: 153 mg/dL (06 Nov 2017 11:51)  POCT Blood Glucose.: 96 mg/dL (06 Nov 2017 07:42)            RADIOLOGY & ADDITIONAL TESTS:    Imaging Personally Reviewed:  [ ] YES  [ ] NO TE[employer] 4 Medicine Intern: Guillermo Narayanan  Spectralink: [phone]  Pager: [phone]      Patient is a 85y old  Female who presents with a chief complaint of SOB, n/v (01 Nov 2017 02:39)      INTERVAL HPI/OVERNIGHT EVENTS: Overnight, no acute events. Patient did not go for stress test yesterday, will go today. Patient seen and examined at bedside this AM. Patient denies nausea/vomiting, chest pain, fevers/chills, diarrhea/constipation, shortness of breath, palpitations, diaphoresis.    T(C): 36.7 (11-07-17 @ 04:08), Max: 36.8 (11-06-17 @ 20:44)  HR: 55 (11-07-17 @ 04:08) (54 - 59)  BP: 144/69 (11-07-17 @ 04:08) (144/60 - 166/70)  RR: 18 (11-07-17 @ 04:08) (16 - 18)  SpO2: 97% (11-07-17 @ 04:08) (97% - 100%)  Wt(kg): --Vital Signs Last 24 Hrs  T(C): 36.7 (07 Nov 2017 04:08), Max: 36.8 (06 Nov 2017 20:44)  T(F): 98 (07 Nov 2017 04:08), Max: 98.2 (06 Nov 2017 20:44)  HR: 55 (07 Nov 2017 04:08) (54 - 59)  BP: 144/69 (07 Nov 2017 04:08) (144/60 - 166/70)  BP(mean): --  RR: 18 (07 Nov 2017 04:08) (16 - 18)  SpO2: 97% (07 Nov 2017 04:08) (97% - 100%)    PHYSICAL EXAM:  Appearance: AOx3, NAD, lying in bed comfortably  HEENT: MMM, PERRL, EOMI, NC/AT  Cardiovascular: normal S1 and S2, RRR, no m/r/g, no edema, no JVD  Respiratory: Clear to auscultation bilaterally, no wheezes  Gastrointestinal: Soft, non-tender, non-distended, BS+  Musculoskeletal: No clubbing, no joint deformity   Skin: No rashes, no ecchymoses, no cyanosis    Consultant(s) Notes Reviewed:  [x ] YES  [ ] NO  Care Discussed with Consultants/Other Providers [ x] YES  [ ] NO    LABS:                        10.9   8.86  )-----------( 311      ( 06 Nov 2017 07:21 )             31.9     11-06    132<L>  |  98  |  54<H>  ----------------------------<  110<H>  4.7   |  21<L>  |  2.30<H>    Ca    9.3      06 Nov 2017 08:30      PT/INR - ( 06 Nov 2017 07:13 )   PT: 11.7 sec;   INR: 1.03 ratio         PTT - ( 06 Nov 2017 07:13 )  PTT:71.6 sec    CAPILLARY BLOOD GLUCOSE  279 (06 Nov 2017 16:26)  153 (06 Nov 2017 11:51)  96 (06 Nov 2017 07:40)      POCT Blood Glucose.: 247 mg/dL (06 Nov 2017 20:49)  POCT Blood Glucose.: 279 mg/dL (06 Nov 2017 16:26)  POCT Blood Glucose.: 153 mg/dL (06 Nov 2017 11:51)  POCT Blood Glucose.: 96 mg/dL (06 Nov 2017 07:42)            RADIOLOGY & ADDITIONAL TESTS:    Imaging Personally Reviewed:  [ ] YES  [ ] NO

## 2017-11-07 NOTE — PROGRESS NOTE ADULT - SUBJECTIVE AND OBJECTIVE BOX
Interval Events:  Remains in NSR  Awaiting ischemia eval    Review Of Systems:  Constitutional: [ ] Fever [ ] Chills [ ] Fatigue [ ] Weight change   HEENT: [ ] Blurred vision [ ] Eye Pain [ ] Headache [ ] Runny nose [ ] Sore Throat   Respiratory: [ ] Cough [ ] Wheezing [ ] Shortness of breath  Cardiovascular: [ ] Chest Pain [ ] Palpitations [ ] POZO [ ] PND [ ] Orthopnea  Gastrointestinal: [ ] Abdominal Pain [ ] Diarrhea [ ] Constipation [ ] Hemorrhoids [ ] Nausea [ ] Vomiting  Genitourinary: [ ] Nocturia [ ] Dysuria [ ] Incontinence  Extremities: [ ] Swelling [ ] Joint Pain  Neurologic: [ ] Focal deficit [ ] Paresthesias [ ] Syncope  Lymphatic: [ ] Swelling [ ] Lymphadenopathy   Skin: [ ] Rash [ ] Ecchymoses [ ] Wounds [ ] Lesions  Psychiatry: [ ] Depression [ ] Suicidal/Homicidal Ideation [ ] Anxiety [ ] Sleep Disturbances  [ ] 10 point review of systems is otherwise negative except as mentioned above            [ ]Unable to obtain    Medications:  acetaminophen   Tablet. 650 milliGRAM(s) Oral every 6 hours PRN  amiodarone    Tablet 200 milliGRAM(s) Oral daily  amLODIPine   Tablet 10 milliGRAM(s) Oral daily  bisacodyl 5 milliGRAM(s) Oral at bedtime  buDESOnide   0.5 milliGRAM(s) Respule 0.5 milliGRAM(s) Inhalation two times a day  dextrose 5%. 1000 milliLiter(s) IV Continuous <Continuous>  dextrose 50% Injectable 12.5 Gram(s) IV Push once  dextrose 50% Injectable 25 Gram(s) IV Push once  dextrose 50% Injectable 25 Gram(s) IV Push once  dextrose Gel 1 Dose(s) Oral once PRN  glucagon  Injectable 1 milliGRAM(s) IntraMuscular once PRN  heparin  Infusion.  Unit(s)/Hr IV Continuous <Continuous>  heparin  Injectable 6000 Unit(s) IV Push every 6 hours PRN  heparin  Injectable 3000 Unit(s) IV Push every 6 hours PRN  insulin glargine Injectable (LANTUS) 7 Unit(s) SubCutaneous at bedtime  insulin lispro (HumaLOG) corrective regimen sliding scale   SubCutaneous three times a day before meals  insulin lispro (HumaLOG) corrective regimen sliding scale   SubCutaneous at bedtime  levalbuterol Inhalation 0.63 milliGRAM(s) Inhalation every 8 hours  levothyroxine 75 MICROGram(s) Oral daily  metoprolol     tartrate 12.5 milliGRAM(s) Oral two times a day    PMH/PSH/FH/SH: [ ] Unchanged  Vitals:  T(C): 36.7 (11-07-17 @ 04:08), Max: 36.8 (11-06-17 @ 20:44)  HR: 55 (11-07-17 @ 04:08) (54 - 59)  BP: 144/69 (11-07-17 @ 04:08) (144/60 - 166/70)  BP(mean): --  RR: 18 (11-07-17 @ 04:08) (16 - 18)  SpO2: 97% (11-07-17 @ 04:08) (97% - 100%)  Wt(kg): --  Daily     Daily   I&O's Summary      Physical Exam:  Appearance:  Normal, NAD  Eyes: PERRL, EOMI  HENT: Normal oral [place] NC/AT  Cardiovascular: S1, S2, RRR, No m/r/g appreciated, No edema, no elevation in JVP  Respiratory: Clear to auscultation bilaterally  Gastrointestinal: Soft, Non-tender, Non-distended, BS+  Musculoskeletal:  No clubbing, No joint deformity   Neurologic: Non-focal  Lymphatic: No lymphadenopathy  Psychiatry: AAOx3, Mood & affect appropriate  Skin: No rashes, No ecchymoses, No cyanosis    Labs:                        10.5   8.65  )-----------( 324      ( 07 Nov 2017 07:28 )             31.0     11-07    133<L>  |  99  |  50<H>  ----------------------------<  108<H>  4.8   |  21<L>  |  2.24<H>    Ca    9.0      07 Nov 2017 07:22      PT/INR - ( 06 Nov 2017 07:13 )   PT: 11.7 sec;   INR: 1.03 ratio         PTT - ( 07 Nov 2017 07:38 )  PTT:60.9 sec    Serum Pro-Brain Natriuretic Peptide: 20045 pg/mL (10-31 @ 20:34)    Interpretation of Telemetry: sinus 50-60s

## 2017-11-07 NOTE — DISCHARGE NOTE ADULT - ADDITIONAL INSTRUCTIONS
Please follow up with PCP Dr. Abhishek Colón @ [phone] Please follow up with PCP Dr. Abhishek Colón @ [phone]  Please follow up with Cardiology, Dr. Falcon within 1 week of discharge Please follow up with PCP Dr. Abhishek Colón @ [phone] on 11/9/2017 for Coumadin/INR testing  Please follow up with Cardiology, Dr. Falcon within 1 week of discharge    Please take Coumadin 2.5mg 1 tab every night prior to visit with Dr. Colón  Please take Prednisone 10mg once a day for the next two days  Please take all other medications as prescribed

## 2017-11-07 NOTE — DISCHARGE NOTE ADULT - CARE PLAN
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 You were placed on BIPAP machine to help with your breathing. You were changed over to nasal cannula and eventually to room air. You were also treated with steroids, initially given 125mg of Solumedrol and a Prednisone taper. Please follow up with PCP, Toni Colón after discharge.  Secondary Diagnosis:	Afib  Goal:	Management, Resolution  Instructions for follow-up, activity and diet:	On 11/2, your heart spontaneously converted from sinus rhythm to atrial fibrillation You were anticoagulated with heparin drip and your home xarelto medication was discontinued. Additionally, you were continued on amiodarone and lopressor. You spontaneously converted back into sinus rhythm on 11/5 with an associated 1.6 second pause. You were bridged to coumadin on discharge. Please follow up with your cardiologist within 1 week of discharge. Please follow up with PCP, Toni Colón regarding Coumadin blood testing.  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 During this hospitalization you received a transthoracic echocardiogram which demonstrated moderate diastolic dysfunction, normal LV/RV function and moderate AS. Please follow up with your cardiologist within 1 week of discharge.  Secondary Diagnosis:	Hypertensive emergency  Goal:	Resolution  Instructions for follow-up, activity and diet:	Upon presentation to the emergency room, you were found to have a blood pressure of 200/100. Your blood pressure was controlled with hypertensive medications. Please follow up with your cardiologist within 1 week of discharge. Principal Discharge DX:	Respiratory distress  Goal:	Management  Instructions for follow-up, activity and diet:	On arrival to the emergency room you were found to be in respiratory distress. You were placed on BIPAP machine to help with your breathing. You were changed over to nasal cannula and eventually to room air. You were also treated with steroids, initially given 125mg of Solumedrol and a Prednisone taper. Please follow up with PCPToni after discharge. Please continue to take Prednisone 10mg once day for two more days. Please continue to take all other medications as prescribed.  Secondary Diagnosis:	Afib  Goal:	Management, Resolution  Instructions for follow-up, activity and diet:	On 11/2, your heart spontaneously converted from sinus rhythm to atrial fibrillation You were anticoagulated with heparin drip and your home xarelto medication was discontinued. Additionally, you were continued on amiodarone and lopressor. You spontaneously converted back into sinus rhythm on 11/5 with an associated 1.6 second pause. You were bridged to coumadin on discharge. Please follow up with your cardiologist within 1 week of discharge. Please follow up with PCPToni regarding Coumadin blood testing. Please continue to take medications as prescribed.  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 During this hospitalization you received a transthoracic echocardiogram which demonstrated moderate diastolic dysfunction, normal LV/RV function and moderate AS. Please follow up with your cardiologist within 1 week of discharge. Please continue to take medications as prescribed.  Secondary Diagnosis:	Hypertensive emergency  Goal:	Resolution  Instructions for follow-up, activity and diet:	Upon presentation to the emergency room, you were found to have a blood pressure of 200/100. Your blood pressure was controlled with hypertensive medications. Please follow up with your cardiologist within 1 week of discharge. Please continue to take your medications as prescribed. Principal Discharge DX:	Respiratory distress  Goal:	Management  Instructions for follow-up, activity and diet:	On arrival to the emergency room you were found to be in respiratory distress. You were placed on BIPAP machine to help with your breathing. You were changed over to nasal cannula and eventually to room air. You were also treated with steroids, initially given 125mg of Solumedrol and a Prednisone taper. Please follow up with PCPToni after discharge. Please continue to take Prednisone 10mg once day for two more days. Please continue to take all other medications as prescribed.  Secondary Diagnosis:	Afib  Goal:	Management, Resolution  Instructions for follow-up, activity and diet:	On 11/2, your heart spontaneously converted from sinus rhythm to atrial fibrillation You were anticoagulated with heparin drip and your home xarelto medication was discontinued. Additionally, you were continued on amiodarone and lopressor. You spontaneously converted back into sinus rhythm on 11/5 with an associated 1.6 second pause. You were bridged to coumadin on discharge. Please follow up with your cardiologist within 1 week of discharge. Please follow up with PCPToni regarding Coumadin blood testing. Please continue to take medications as prescribed.  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 During this hospitalization you received a transthoracic echocardiogram which demonstrated moderate diastolic dysfunction, normal LV/RV function and moderate AS. Please follow up with your cardiologist within 1 week of discharge. Please continue to take medications as prescribed. Please start taking Aspirin and Atorvastatin after you  your new prescriptions from the pharmacy.  Secondary Diagnosis:	Hypertensive emergency  Goal:	Resolution  Instructions for follow-up, activity and diet:	Upon presentation to the emergency room, you were found to have a blood pressure of 200/100. Your blood pressure was controlled with hypertensive medications. Please follow up with your cardiologist within 1 week of discharge. Please continue to take your medications as prescribed.

## 2017-11-07 NOTE — DISCHARGE NOTE ADULT - HOSPITAL COURSE
History of Present Illness  84yo F w/ PMHx of DM, HTN, hypothyroidism, asthma, newly diagnosed afib BIBEMS yesterday for sob and n/v at home. Patient recently [place] back to the [place] 5 months ago. She was under normal health status and compliant on her medication until 5 days ago. Patient felt generalized weakness at that time; over the weekend, patient felt worsening sob especially with ambulation/exertion. (Patient at baseline walks with a cane at home freely.) Yesterday, patient started to develop nausea and vomitting 4 episodes NBNB. Patient was not able to tolerate po intake. Otherwise, patient denied fever, chills, no diarrhea/constipation, no chest pain, no dizziness, no urinary burning or difficulty.     Hospital Course  In the ER, patient was found to have sinus armand ~55, BP elevated 200s/100s, Sating well 100 on 10/5 35% bipap and remains afebrile. EKG with sinus bradycardia, intermittent 1st degree AV block. Patient was found to have hyperK to 6.3 in the context of SAM. She was given duonebs x 3, calcium gluconate, regular 10u, lasix 8mg, kayxalate with adquate UOP. She was also given solumedrol 125mg given concerns for asthma exacerbation. Patient was admitted to medicine for treatment with cardiology, EP, and nephrology consultants following the case.    11/2 Patient seen with Afib on EKG today, received echo await final report. Outside echo is still pending formal result, ultrasound of kidneys showing mild bilateral nephrosis. Currently on Prednisone 30mg daily   Echo: Mild mitral regurgitation, moderate aortic stenosis. Mild aortic  regurgitation, Moderate diastolic dysfunction (Stage II).   Normal right ventricular size and function.  Discharge on coumadin,   Changed coreg to lopressor   martin removed, f/u TOV  - Per EP, wants to start patient back on amiodarone   -per cards not a candidate for cath at this time, will consider pharm stress test if creatinine improves   11/3 Tapering prednisone, f.u simone regarding stress test   11/4: patient afib on tele. status quo   11/5 Patient NPO after midnight for electrical cardioversion per EP tomorrow Patient with pause on tele conversion to NSR  11/6 Patient will not be getting cardioverted, will be going for nucelar stress test ordered History of Present Illness  84yo F w/ PMHx of DM, HTN, hypothyroidism, asthma, newly diagnosed afib BIBEMS yesterday for sob and n/v at home. [place] recently [place] the [place] 5 [place] ago. She was under normal health status and compliant on her medication until 5 days ago. Patient felt generalized weakness at that time; over the weekend, patient felt worsening sob especially with ambulation/exertion. (Patient at baseline walks with a cane at home freely.) Yesterday, patient started to develop nausea and vomitting 4 episodes NBNB. Patient was not able to tolerate po intake. Otherwise, patient denied fever, chills, no diarrhea/constipation, no chest pain, no dizziness, no urinary burning or difficulty.     Hospital Course  In the ER, patient was found to have sinus armand ~55, BP elevated 200s/100s, Sating well 100 on 10/5 35% bipap and remains afebrile. EKG with sinus bradycardia, intermittent 1st degree AV block. Patient was found to have hyperK to 6.3 in the context of SAM. She was given duonebs x 3, calcium gluconate, regular 10u, lasix 8mg, kayxalate with adquate UOP. She was also given solumedrol 125mg given concerns for asthma exacerbation. Patient was admitted to medicine for treatment with cardiology, EP, and nephrology consultants following the case. On 11/2, patient was noted to spontaneously convert from sinus rhythm to atrial fibrillation. A plan was made for electrical cardioversion on 11/6. Xarelto was held for patient's SAM and the team ultimately decided that it was not a good medication for the patient and started her on a heparin drip for anticoagulation given outpatient atrial fibrillation. Cardiology recommended continuing with amiodarone. Coreg was changed to lopressor. Echocardiogram performed on 11/2 significant for moderate diastolic dysfunction, normal LV/RV function and moderate AS. Ultrasound of the kidneys demonstrated mild bilateral hydronephrosis. Additionally, martin catheter was removed and patient passed trial of void. Patient was started on Prednisone taper 20mg for 3 days and then 10mg for subsequent 3 days. Additionally, patient was started on Pulmicort and levalbuterol. On 11/5, patient spontaneously converted back into sinus rhythm with a 1.6 second pause. The plan for cardioversion was subsequently cancelled. On 11/7, patient went for nuclear exercise stress test as per cardiology recommendations. History of Present Illness  84yo F w/ PMHx of DM, HTN, hypothyroidism, asthma, newly diagnosed afib BIBEMS yesterday for sob and n/v at home. [place] 5 [place] ago. She was under normal health status and compliant on her medication until 5 days ago. Patient felt generalized weakness at that time; over the weekend, patient felt worsening sob especially with ambulation/exertion. (Patient at baseline walks with a cane at home freely.) Yesterday, patient started to develop nausea and vomitting 4 episodes NBNB. Patient was not able to tolerate po intake. Otherwise, patient denied fever, chills, no diarrhea/constipation, no chest pain, no dizziness, no urinary burning or difficulty.     Hospital Course  In the ER, patient was found to have sinus armand ~55, BP elevated 200s/100s, Sating well 100 on 10/5 35% bipap and remains afebrile. EKG with sinus bradycardia, intermittent 1st degree AV block. Patient was found to have hyperK to 6.3 in the context of SAM. She was given duonebs x 3, calcium gluconate, regular 10u, lasix 8mg, kayxalate with adquate UOP. She was also given solumedrol 125mg given concerns for asthma exacerbation. Patient was admitted to medicine for treatment with cardiology, EP, and nephrology consultants following the case. On 11/2, patient was noted to spontaneously convert from sinus rhythm to atrial fibrillation. A plan was made for electrical cardioversion on 11/6. Xarelto was held for patient's SAM and the team ultimately decided that it was not a good medication for the patient and started her on a heparin drip for anticoagulation given outpatient atrial fibrillation. Cardiology recommended continuing with amiodarone. Coreg was changed to lopressor. Echocardiogram performed on 11/2 significant for moderate diastolic dysfunction, normal LV/RV function and moderate AS. Ultrasound of the kidneys demonstrated mild bilateral hydronephrosis. Additionally, martin catheter was removed and patient passed trial of void. Patient was started on Prednisone taper 20mg for 3 days and then 10mg for subsequent 3 days. Additionally, patient was started on Pulmicort and levalbuterol. On 11/5, patient spontaneously converted back into sinus rhythm with a 1.6 second pause. The plan for cardioversion was subsequently cancelled. On 11/7, patient went for nuclear exercise stress test as per cardiology recommendations. Stress test revealed large, mild to moderate defects in the inferior, inferoapical, inferolateral, and apical lateral walls that are mostly  reversible suggestive of ischemia with partial scarring. Patient stable for discharge. History of Present Illness  86yo F w/ PMHx of DM, HTN, hypothyroidism, asthma, newly diagnosed afib BIBEMS yesterday for sob and n/v at home. [place] recently [place] 5 [place] ago. She was under normal health status and compliant on her medication until 5 days ago. Patient felt generalized weakness at that time; over the weekend, patient felt worsening sob especially with ambulation/exertion. (Patient at baseline walks with a cane at home freely.) Yesterday, patient started to develop nausea and vomitting 4 episodes NBNB. Patient was not able to tolerate po intake. Otherwise, patient denied fever, chills, no diarrhea/constipation, no chest pain, no dizziness, no urinary burning or difficulty.     Hospital Course  In the ER, patient was found to have sinus armand ~55, BP elevated 200s/100s, Sating well 100 on 10/5 35% bipap and remains afebrile. EKG with sinus bradycardia, intermittent 1st degree AV block. Patient was found to have hyperK to 6.3 in the context of SAM. She was given duonebs x 3, calcium gluconate, regular 10u, lasix 8mg, kayxalate with adquate UOP. She was also given solumedrol 125mg given concerns for asthma exacerbation. Patient was admitted to medicine for treatment with cardiology, EP, and nephrology consultants following the case. On 11/2, patient was noted to spontaneously convert from sinus rhythm to atrial fibrillation. A plan was made for electrical cardioversion on 11/6. Xarelto was held for patient's SAM and the team ultimately decided that it was not a good medication for the patient and started her on a heparin drip for anticoagulation given outpatient atrial fibrillation. Cardiology recommended continuing with amiodarone. Coreg was changed to lopressor. Echocardiogram performed on 11/2 significant for moderate diastolic dysfunction, normal LV/RV function and moderate AS. Ultrasound of the kidneys demonstrated mild bilateral hydronephrosis. Additionally, martin catheter was removed and patient passed trial of void. Patient was started on Prednisone taper 20mg for 3 days and then 10mg for subsequent 3 days. Additionally, patient was started on Pulmicort and levalbuterol. On 11/5, patient spontaneously converted back into sinus rhythm with a 1.6 second pause. The plan for cardioversion was subsequently cancelled. On 11/7, patient went for nuclear exercise stress test as per cardiology recommendations. Stress test revealed large, mild to moderate defects in the inferior, inferoapical, inferolateral, and apical lateral walls that are mostly  reversible suggestive of ischemia with partial scarring. Patient stable for discharge.  Discharge time- 42 min

## 2017-11-07 NOTE — DISCHARGE NOTE ADULT - MEDICATION SUMMARY - MEDICATIONS TO CHANGE
I will SWITCH the dose or number of times a day I take the medications listed below when I get home from the hospital:    amiodarone 400 mg oral tablet  -- 1 tab(s) by mouth once a day    Lopressor 50 mg oral tablet  -- 1 tab(s) by mouth 2 times a day

## 2017-11-07 NOTE — DISCHARGE NOTE ADULT - PLAN OF CARE
Management On arrival to the emergency room you were found to be in respiratory distress. You were placed on BIPAP machine to help with your breathing. You were changed over to nasal cannula and eventually to room air. You were also treated with steroids, initially given 125mg of Solumedrol and a Prednisone taper. Please follow up with PCP, Toni Colón after discharge. Management, Resolution On 11/2, your heart spontaneously converted from sinus rhythm to atrial fibrillation You were anticoagulated with heparin drip and your home xarelto medication was discontinued. Additionally, you were continued on amiodarone and lopressor. You spontaneously converted back into sinus rhythm on 11/5 with an associated 1.6 second pause. You were bridged to coumadin on discharge. Please follow up with your cardiologist within 1 week of discharge. Please follow up with PCP, Toni Colón regarding Coumadin blood testing. Upon arrival to the emergency room, a bedside echo found severely reduced LV function. During this hospitalization you received a transthoracic echocardiogram which demonstrated moderate diastolic dysfunction, normal LV/RV function and moderate AS. Please follow up with your cardiologist within 1 week of discharge. Resolution Upon presentation to the emergency room, you were found to have a blood pressure of 200/100. Your blood pressure was controlled with hypertensive medications. Please follow up with your cardiologist within 1 week of discharge. On arrival to the emergency room you were found to be in respiratory distress. You were placed on BIPAP machine to help with your breathing. You were changed over to nasal cannula and eventually to room air. You were also treated with steroids, initially given 125mg of Solumedrol and a Prednisone taper. Please follow up with PCP, Toni Colón after discharge. Please continue to take Prednisone 10mg once day for two more days. Please continue to take all other medications as prescribed. On 11/2, your heart spontaneously converted from sinus rhythm to atrial fibrillation You were anticoagulated with heparin drip and your home xarelto medication was discontinued. Additionally, you were continued on amiodarone and lopressor. You spontaneously converted back into sinus rhythm on 11/5 with an associated 1.6 second pause. You were bridged to coumadin on discharge. Please follow up with your cardiologist within 1 week of discharge. Please follow up with PCP, Toni Colón regarding Coumadin blood testing. Please continue to take medications as prescribed. Upon arrival to the emergency room, a bedside echo found severely reduced LV function. During this hospitalization you received a transthoracic echocardiogram which demonstrated moderate diastolic dysfunction, normal LV/RV function and moderate AS. Please follow up with your cardiologist within 1 week of discharge. Please continue to take medications as prescribed. Upon presentation to the emergency room, you were found to have a blood pressure of 200/100. Your blood pressure was controlled with hypertensive medications. Please follow up with your cardiologist within 1 week of discharge. Please continue to take your medications as prescribed. Upon arrival to the emergency room, a bedside echo found severely reduced LV function. During this hospitalization you received a transthoracic echocardiogram which demonstrated moderate diastolic dysfunction, normal LV/RV function and moderate AS. Please follow up with your cardiologist within 1 week of discharge. Please continue to take medications as prescribed. Please start taking Aspirin and Atorvastatin after you  your new prescriptions from the pharmacy.

## 2017-11-07 NOTE — PROGRESS NOTE ADULT - ATTENDING COMMENTS
Feeling improved  1.  Renal failure--non oliguric, no HD  2.  CHF--greatly improved  3.  HypoNa+--related to 1&2.  Improved, Water restrict

## 2017-11-07 NOTE — DISCHARGE NOTE ADULT - CARE PROVIDER_API CALL
Abhishek Colón  Address: [address] [place] [id number]  Phone: [phone]  Fax: (       - Abhishek Colón  Address: [address] [id number]  Phone: [phone]  Fax: (   )    -    David Falcon), Cardiovascular Disease; Internal Medicine; Nuclear Cardiology  [address] [place] [address]  Phone: [phone]  Fax: [phone]

## 2017-11-07 NOTE — PROGRESS NOTE ADULT - PROBLEM SELECTOR PLAN 4
Baseline Cr around 2.0, Renal plan noted. CKD is likely from DM  - Cr now stable at ~2.3  - Holding diuretics  - Serology for vasculitis, infectious, MM in progress

## 2017-11-07 NOTE — DISCHARGE NOTE ADULT - MEDICATION SUMMARY - MEDICATIONS TO STOP TAKING
I will STOP taking the medications listed below when I get home from the hospital:    Xarelto 20 mg oral tablet  -- 1 tab(s) by mouth once a day (in the evening)

## 2017-11-07 NOTE — DISCHARGE NOTE ADULT - MEDICATION SUMMARY - MEDICATIONS TO TAKE
I will START or STAY ON the medications listed below when I get home from the hospital:    lisinopril 5 mg oral tablet  -- 1 tab(s) by mouth once a day  -- Indication: For HTN (hypertension)    glimepiride 1 mg oral tablet  -- 1 tab(s) by mouth once a day  -- Indication: For Diabetes    ProAir HFA  -- 108  inhaled  -- Indication: For Asthma    Advair Diskus 250 mcg-50 mcg inhalation powder  -- 1 puff(s) inhaled 2 times a day  -- Indication: For Asthma    amLODIPine 10 mg oral tablet  -- 1 tab(s) by mouth once a day  -- Indication: For HTN (hypertension)    Synthroid 75 mcg (0.075 mg) oral tablet  -- 1 tab(s) by mouth once a day  -- Indication: For Hypothyroid I will START or STAY ON the medications listed below when I get home from the hospital:    predniSONE 10 mg oral tablet  -- 1 tab(s) by mouth once a day  -- Indication: For Asthma    lisinopril 5 mg oral tablet  -- 1 tab(s) by mouth once a day  -- Indication: For HTN (hypertension)    amiodarone 200 mg oral tablet  -- 1 tab(s) by mouth once a day  -- Indication: For Afib    Coumadin 2.5 mg oral tablet  -- 1 tab(s) by mouth once a day (at bedtime)   -- Do not take this drug if you are pregnant.  It is very important that you take or use this exactly as directed.  Do not skip doses or discontinue unless directed by your doctor.  Obtain medical advice before taking any non-prescription drugs as some may affect the action of this medication.    -- Indication: For Afib    glimepiride 1 mg oral tablet  -- 1 tab(s) by mouth once a day  -- Indication: For Diabetes    metoprolol tartrate 25 mg oral tablet  -- 0.5 tab(s) by mouth 2 times a day   -- It is very important that you take or use this exactly as directed.  Do not skip doses or discontinue unless directed by your doctor.  May cause drowsiness.  Alcohol may intensify this effect.  Use care when operating dangerous machinery.  Some non-prescription drugs may aggravate your condition.  Read all labels carefully.  If a warning appears, check with your doctor before taking.  Take with food or milk.  This drug may impair the ability to drive or operate machinery.  Use care until you become familiar with its effects.    -- Indication: For HTN (hypertension)    ProAir HFA  -- 108  inhaled  -- Indication: For Asthma    Advair Diskus 250 mcg-50 mcg inhalation powder  -- 1 puff(s) inhaled 2 times a day  -- Indication: For Asthma    amLODIPine 10 mg oral tablet  -- 1 tab(s) by mouth once a day  -- Indication: For HTN (hypertension)    Synthroid 75 mcg (0.075 mg) oral tablet  -- 1 tab(s) by mouth once a day  -- Indication: For Hypothyroid I will START or STAY ON the medications listed below when I get home from the hospital:    predniSONE 10 mg oral tablet  -- 1 tab(s) by mouth once a day  -- Indication: For Asthma    aspirin 81 mg oral delayed release tablet  -- 1 tab(s) by mouth once a day  -- 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  It is very important that you take or use this exactly as directed.  Do not skip doses or discontinue unless directed by your doctor.  Obtain medical advice before taking any non-prescription drugs as some may affect the action of this medication.    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  Do not skip doses or discontinue unless directed by your doctor.  May cause drowsiness.  Alcohol may intensify this effect.  Use care when operating dangerous machinery.  Some non-prescription drugs may aggravate your condition.  Read all labels carefully.  If a warning appears, check with your doctor before taking.  Take with food or milk.  This drug may impair the ability to drive or operate machinery.  Use care until you become familiar with its effects.    -- Indication: For HTN (hypertension)    ProAir HFA  -- 108  inhaled  -- Indication: For Asthma    Advair Diskus 250 mcg-50 mcg inhalation powder  -- 1 puff(s) inhaled 2 times a day  -- Indication: For Asthma    amLODIPine 10 mg oral tablet  -- 1 tab(s) by mouth once a day  -- Indication: For HTN (hypertension)    Synthroid 75 mcg (0.075 mg) oral tablet  -- 1 tab(s) by mouth once a day  -- Indication: For Hypothyroid

## 2017-11-07 NOTE — PROGRESS NOTE ADULT - PROBLEM SELECTOR PLAN 3
-appropriately on a sodium restricted diet  -sodium of 133 today, no acute intervention required, would continue to monitor

## 2017-11-07 NOTE — PROGRESS NOTE ADULT - PROBLEM SELECTOR PLAN 3
No wheeze, cough this AM  - c/w 3 days of 10mg of prednisone  - c/w Pulmicort 0.5mg BID  - c/w xopinex 0.63 q8h

## 2017-11-07 NOTE — PROGRESS NOTE ADULT - ASSESSMENT
85 year old female acute pulmonary edema in setting of elevated BP, likely acute HFpEF, new on set AF, SAM  ·	Patient with stress test. Inferior wall apical ischemia with some scar. Given crt and patient's lack of symptoms and non-involvement of LAD and preserved post-stress EF will manage medically.  ·	Continue beta blocker. Add lipitor 20 mg qd. Add ASA 81 mg qd.   ·	Remains in NSR. Continue Amio. Change heparin to coumadin. Goal INR 2-3. Does not need to be therapeutic prior to discharge.   ·	Discussed with family at bedside and Dr. Chavira.

## 2017-11-07 NOTE — DISCHARGE NOTE ADULT - CARE PROVIDERS DIRECT ADDRESSES
,DirectAddress_Unknown ,DirectAddress_Unknown,[email or web address]

## 2017-11-07 NOTE — DISCHARGE NOTE ADULT - PROVIDER TOKENS
FREE:[LAST:[Eldon],FIRST:[Abhishek],PHONE:[phone]],FAX:[(   )    -],ADDRESS:[Address: [address] [place] [address] [place] [address]]] FREE:[LAST:[Eldon],FIRST:[Abhishek],PHONE:[phone]:[(   [phone]    -],ADDRESS:[Address: [address] [place], [place] [id number]]],TOKEN:'[id number]'

## 2017-11-08 VITALS — SYSTOLIC BLOOD PRESSURE: 152 MMHG | HEART RATE: 62 BPM | DIASTOLIC BLOOD PRESSURE: 68 MMHG

## 2017-11-08 LAB
APTT BLD: 25.3 SEC — LOW (ref 27.5–37.4)
GLUCOSE BLDC GLUCOMTR-MCNC: 86 MG/DL — SIGNIFICANT CHANGE UP (ref 70–99)
INR BLD: 1.03 RATIO — SIGNIFICANT CHANGE UP (ref 0.88–1.16)
PROTHROM AB SERPL-ACNC: 11.1 SEC — SIGNIFICANT CHANGE UP (ref 9.8–12.7)

## 2017-11-08 PROCEDURE — 82570 ASSAY OF URINE CREATININE: CPT

## 2017-11-08 PROCEDURE — 81001 URINALYSIS AUTO W/SCOPE: CPT

## 2017-11-08 PROCEDURE — 71045 X-RAY EXAM CHEST 1 VIEW: CPT

## 2017-11-08 PROCEDURE — 84166 PROTEIN E-PHORESIS/URINE/CSF: CPT

## 2017-11-08 PROCEDURE — 86803 HEPATITIS C AB TEST: CPT

## 2017-11-08 PROCEDURE — 87340 HEPATITIS B SURFACE AG IA: CPT

## 2017-11-08 PROCEDURE — 83880 ASSAY OF NATRIURETIC PEPTIDE: CPT

## 2017-11-08 PROCEDURE — 82550 ASSAY OF CK (CPK): CPT

## 2017-11-08 PROCEDURE — 82947 ASSAY GLUCOSE BLOOD QUANT: CPT

## 2017-11-08 PROCEDURE — 86225 DNA ANTIBODY NATIVE: CPT

## 2017-11-08 PROCEDURE — 96375 TX/PRO/DX INJ NEW DRUG ADDON: CPT

## 2017-11-08 PROCEDURE — 80053 COMPREHEN METABOLIC PANEL: CPT

## 2017-11-08 PROCEDURE — 85730 THROMBOPLASTIN TIME PARTIAL: CPT

## 2017-11-08 PROCEDURE — 83605 ASSAY OF LACTIC ACID: CPT

## 2017-11-08 PROCEDURE — 86709 HEPATITIS A IGM ANTIBODY: CPT

## 2017-11-08 PROCEDURE — 94640 AIRWAY INHALATION TREATMENT: CPT

## 2017-11-08 PROCEDURE — 82962 GLUCOSE BLOOD TEST: CPT

## 2017-11-08 PROCEDURE — 84100 ASSAY OF PHOSPHORUS: CPT

## 2017-11-08 PROCEDURE — 85014 HEMATOCRIT: CPT

## 2017-11-08 PROCEDURE — 87389 HIV-1 AG W/HIV-1&-2 AB AG IA: CPT

## 2017-11-08 PROCEDURE — 82330 ASSAY OF CALCIUM: CPT

## 2017-11-08 PROCEDURE — 84295 ASSAY OF SERUM SODIUM: CPT

## 2017-11-08 PROCEDURE — 85610 PROTHROMBIN TIME: CPT

## 2017-11-08 PROCEDURE — 80048 BASIC METABOLIC PNL TOTAL CA: CPT

## 2017-11-08 PROCEDURE — 83930 ASSAY OF BLOOD OSMOLALITY: CPT

## 2017-11-08 PROCEDURE — 82803 BLOOD GASES ANY COMBINATION: CPT

## 2017-11-08 PROCEDURE — 84484 ASSAY OF TROPONIN QUANT: CPT

## 2017-11-08 PROCEDURE — 83036 HEMOGLOBIN GLYCOSYLATED A1C: CPT

## 2017-11-08 PROCEDURE — A9505: CPT

## 2017-11-08 PROCEDURE — 78452 HT MUSCLE IMAGE SPECT MULT: CPT

## 2017-11-08 PROCEDURE — 84165 PROTEIN E-PHORESIS SERUM: CPT

## 2017-11-08 PROCEDURE — 87581 M.PNEUMON DNA AMP PROBE: CPT

## 2017-11-08 PROCEDURE — 86038 ANTINUCLEAR ANTIBODIES: CPT

## 2017-11-08 PROCEDURE — 83521 IG LIGHT CHAINS FREE EACH: CPT

## 2017-11-08 PROCEDURE — 87040 BLOOD CULTURE FOR BACTERIA: CPT

## 2017-11-08 PROCEDURE — 84132 ASSAY OF SERUM POTASSIUM: CPT

## 2017-11-08 PROCEDURE — 99239 HOSP IP/OBS DSCHRG MGMT >30: CPT

## 2017-11-08 PROCEDURE — 93306 TTE W/DOPPLER COMPLETE: CPT

## 2017-11-08 PROCEDURE — 83690 ASSAY OF LIPASE: CPT

## 2017-11-08 PROCEDURE — A9500: CPT

## 2017-11-08 PROCEDURE — 86705 HEP B CORE ANTIBODY IGM: CPT

## 2017-11-08 PROCEDURE — 99285 EMERGENCY DEPT VISIT HI MDM: CPT | Mod: 25

## 2017-11-08 PROCEDURE — 83516 IMMUNOASSAY NONANTIBODY: CPT

## 2017-11-08 PROCEDURE — 82435 ASSAY OF BLOOD CHLORIDE: CPT

## 2017-11-08 PROCEDURE — 84443 ASSAY THYROID STIM HORMONE: CPT

## 2017-11-08 PROCEDURE — 84300 ASSAY OF URINE SODIUM: CPT

## 2017-11-08 PROCEDURE — 82553 CREATINE MB FRACTION: CPT

## 2017-11-08 PROCEDURE — 93308 TTE F-UP OR LMTD: CPT

## 2017-11-08 PROCEDURE — 86036 ANCA SCREEN EACH ANTIBODY: CPT

## 2017-11-08 PROCEDURE — 93017 CV STRESS TEST TRACING ONLY: CPT

## 2017-11-08 PROCEDURE — 83735 ASSAY OF MAGNESIUM: CPT

## 2017-11-08 PROCEDURE — 94660 CPAP INITIATION&MGMT: CPT

## 2017-11-08 PROCEDURE — 86706 HEP B SURFACE ANTIBODY: CPT

## 2017-11-08 PROCEDURE — 87486 CHLMYD PNEUM DNA AMP PROBE: CPT

## 2017-11-08 PROCEDURE — 87633 RESP VIRUS 12-25 TARGETS: CPT

## 2017-11-08 PROCEDURE — 84155 ASSAY OF PROTEIN SERUM: CPT

## 2017-11-08 PROCEDURE — 76775 US EXAM ABDO BACK WALL LIM: CPT

## 2017-11-08 PROCEDURE — 93005 ELECTROCARDIOGRAM TRACING: CPT

## 2017-11-08 PROCEDURE — 86780 TREPONEMA PALLIDUM: CPT

## 2017-11-08 PROCEDURE — 87086 URINE CULTURE/COLONY COUNT: CPT

## 2017-11-08 PROCEDURE — 84156 ASSAY OF PROTEIN URINE: CPT

## 2017-11-08 PROCEDURE — 85027 COMPLETE CBC AUTOMATED: CPT

## 2017-11-08 PROCEDURE — 86160 COMPLEMENT ANTIGEN: CPT

## 2017-11-08 PROCEDURE — 86704 HEP B CORE ANTIBODY TOTAL: CPT

## 2017-11-08 PROCEDURE — 96374 THER/PROPH/DIAG INJ IV PUSH: CPT

## 2017-11-08 PROCEDURE — 83935 ASSAY OF URINE OSMOLALITY: CPT

## 2017-11-08 PROCEDURE — 87798 DETECT AGENT NOS DNA AMP: CPT

## 2017-11-08 RX ORDER — ASPIRIN/CALCIUM CARB/MAGNESIUM 324 MG
1 TABLET ORAL
Qty: 30 | Refills: 0 | OUTPATIENT
Start: 2017-11-08 | End: 2017-12-08

## 2017-11-08 RX ORDER — ASPIRIN/CALCIUM CARB/MAGNESIUM 324 MG
81 TABLET ORAL DAILY
Qty: 0 | Refills: 0 | Status: DISCONTINUED | OUTPATIENT
Start: 2017-11-08 | End: 2017-11-08

## 2017-11-08 RX ORDER — ATORVASTATIN CALCIUM 80 MG/1
20 TABLET, FILM COATED ORAL AT BEDTIME
Qty: 0 | Refills: 0 | Status: DISCONTINUED | OUTPATIENT
Start: 2017-11-08 | End: 2017-11-08

## 2017-11-08 RX ORDER — ATORVASTATIN CALCIUM 80 MG/1
1 TABLET, FILM COATED ORAL
Qty: 30 | Refills: 0 | OUTPATIENT
Start: 2017-11-08 | End: 2017-12-08

## 2017-11-08 RX ADMIN — Medication 10 MILLIGRAM(S): at 05:43

## 2017-11-08 RX ADMIN — Medication 12.5 MILLIGRAM(S): at 05:43

## 2017-11-08 RX ADMIN — LEVALBUTEROL 0.63 MILLIGRAM(S): 1.25 SOLUTION, CONCENTRATE RESPIRATORY (INHALATION) at 05:43

## 2017-11-08 RX ADMIN — AMIODARONE HYDROCHLORIDE 200 MILLIGRAM(S): 400 TABLET ORAL at 05:43

## 2017-11-08 RX ADMIN — Medication 75 MICROGRAM(S): at 05:43

## 2017-11-08 RX ADMIN — Medication 0.5 MILLIGRAM(S): at 05:44

## 2017-11-08 RX ADMIN — AMLODIPINE BESYLATE 10 MILLIGRAM(S): 2.5 TABLET ORAL at 06:51

## 2017-11-08 NOTE — PROGRESS NOTE ADULT - PROVIDER SPECIALTY LIST ADULT
Cardiology
Electrophysiology
Internal Medicine
Nephrology
Cardiology
Internal Medicine
Electrophysiology
Nephrology

## 2017-11-08 NOTE — PROGRESS NOTE ADULT - SUBJECTIVE AND OBJECTIVE BOX
[employer] Intern: Guillermo Narayanan  Spectralink: [phone]  Pager: [phone]      Patient is a 85y old  Female who presents with a chief complaint of SOB, N/V (07 Nov 2017 15:13)      INTERVAL HPI/OVERNIGHT EVENTS: Overnight, no acute events. Patient went for  will go today. Patient seen and examined at bedside this AM. Patient denies nausea/vomiting, chest pain, fevers/chills, diarrhea/constipation, shortness of breath, palpitations, diaphoresis.      T(C): 36.6 (11-08-17 @ 04:38), Max: 36.7 (11-07-17 @ 11:53)  HR: 62 (11-08-17 @ 06:47) (54 - 62)  BP: 152/68 (11-08-17 @ 06:47) (144/68 - 162/81)  RR: 18 (11-08-17 @ 04:38) (18 - 18)  SpO2: 99% (11-08-17 @ 04:38) (98% - 99%)  Wt(kg): --Vital Signs Last 24 Hrs  T(C): 36.6 (08 Nov 2017 04:38), Max: 36.7 (07 Nov 2017 11:53)  T(F): 97.9 (08 Nov 2017 04:38), Max: 98.1 (07 Nov 2017 11:53)  HR: 62 (08 Nov 2017 06:47) (54 - 62)  BP: 152/68 (08 Nov 2017 06:47) (144/68 - 162/81)  BP(mean): --  RR: 18 (08 Nov 2017 04:38) (18 - 18)  SpO2: 99% (08 Nov 2017 04:38) (98% - 99%)    PHYSICAL EXAM:  Appearance: AOx3, NAD, lying in bed comfortably  HEENT: MMM, PERRL, EOMI, NC/AT  Cardiovascular: normal S1 and S2, RRR, no m/r/g, no edema, no JVD  Respiratory: Clear to auscultation bilaterally, no wheezes  Gastrointestinal: Soft, non-tender, non-distended, BS+  Musculoskeletal: No clubbing, no joint deformity   Skin: No rashes, no ecchymoses, no cyanosis    Consultant(s) Notes Reviewed:  [x ] YES  [ ] NO  Care Discussed with Consultants/Other Providers [ x] YES  [ ] NO    LABS:                        10.5   8.65  )-----------( 324      ( 07 Nov 2017 07:28 )             31.0     11-07    133<L>  |  99  |  50<H>  ----------------------------<  108<H>  4.8   |  21<L>  |  2.24<H>    Ca    9.0      07 Nov 2017 07:22      PT/INR - ( 08 Nov 2017 06:07 )   PT: 11.1 sec;   INR: 1.03 ratio         PTT - ( 08 Nov 2017 06:07 )  PTT:25.3 sec    CAPILLARY BLOOD GLUCOSE  86 (08 Nov 2017 07:22)  161 (07 Nov 2017 11:53)      POCT Blood Glucose.: 86 mg/dL (08 Nov 2017 07:23)  POCT Blood Glucose.: 206 mg/dL (07 Nov 2017 21:13)  POCT Blood Glucose.: 192 mg/dL (07 Nov 2017 17:26)  POCT Blood Glucose.: 161 mg/dL (07 Nov 2017 11:58)            RADIOLOGY & ADDITIONAL TESTS:    Imaging Personally Reviewed:  [ ] YES  [ ] NO

## 2017-11-08 NOTE — PROGRESS NOTE ADULT - NSHPATTENDINGPLANDISCUSS_GEN_ALL_CORE
house staff
house staff
med team
patient and family members at bedside
med team
patient and family members at bedside
house staff

## 2020-05-08 NOTE — H&P ADULT - NSHPSOURCEINFORD_GEN_ALL_CORE
Chart(s)/Other Family Member/Patient Post-Care Instructions: I reviewed with the patient in detail post-care instructions. Patient is to wear sunprotection, and avoid picking at any of the treated lesions. Pt may apply Vaseline to crusted or scabbing areas. Render Note In Bullet Format When Appropriate: No Detail Level: Detailed Consent: The patient's consent was obtained including but not limited to risks of crusting, scabbing, blistering, scarring, darker or lighter pigmentary change, recurrence, incomplete removal and infection. Duration Of Freeze Thaw-Cycle (Seconds): 0

## 2020-08-04 NOTE — PROGRESS NOTE ADULT - PROBLEM SELECTOR PLAN 4
Faxed note to dental office. Sodium of 128 this AM (11/2) likely 2/2 to HF  - will give patient 250ccs @ 50ml/hr - Sodium now 130 s/p 250ml NS bolus  - continue to monitor BMP daily

## 2021-04-22 NOTE — PATIENT PROFILE ADULT. - PURPOSEFUL PROACTIVE ROUNDING
Left message for Grace Bryan requesting return call to clinic once message is received regarding lab results. Clinic number provided.     Patient

## 2021-08-26 NOTE — PROGRESS NOTE ADULT - PROBLEM SELECTOR PLAN 1
Acknowledgement of Current Treatment Plan     I have reviewed my treatment plan with my therapist / counselor on 8/26/21. I agree with the plan as it is written in the electronic health record, and I have had input into the goals and strategies.       Client Name:   Monisha NICK Paredes   Signature:  _______________________________  Date:  ________ Time: __________     Name of Therapist or Counselor:  Gerri Fregoso MA, [facility], [facility]                Date: August 26, 2021   Time: 8:34 AM          - Patient placed on bipap 10/5, 35% with 100% SaO2 and improving VBG (ph 7.19->7.31). LIkely etiology is flash pulmonary edema in setting of hypertensive emergency  - Will continue to monitor breathing status. Titrate off bipap as tolerated.  - S/p diuresis 80mg IV lasix. Pt had brisk UOP ~2L afterwards, so will c/w reduced 40 IV bid for now  -TTE - Patient placed on bipap 10/5, 35% with 100% SaO2 and improving VBG (ph 7.19->7.31). LIkely etiology is flash pulmonary edema in setting of hypertensive emergency vs asthma  - Will continue to monitor breathing status. Titrate off bipap to 2L NC as tolerated. - Patient placed on bipap 10/5, 35% with 100% SaO2 and improving VBG (ph 7.19->7.31). LIkely etiology is flash pulmonary edema in setting of hypertensive emergency vs asthma excacerbation  - Will continue to monitor breathing status. Titrate off bipap to NC as tolerated.

## 2022-06-08 NOTE — H&P ADULT - DOES THIS PATIENT HAVE A HISTORY OF OR HAS BEEN DX WITH HEART FAILURE?
unknown Electrodesiccation Text: The wound bed was treated with electrodesiccation after the biopsy was performed. yes

## 2023-04-12 NOTE — DISCHARGE NOTE ADULT - NS AS ACTIVITY OBS
clears
Walking-Outdoors allowed/Walking-Indoors allowed/Return to Work/School allowed/Do not drive or operate machinery/No Heavy lifting/straining/Do not make important decisions

## 2023-10-22 NOTE — PROGRESS NOTE ADULT - PROBLEM SELECTOR PLAN 5
Please advise    - New finding of pro-BNP 38878x; bedside echo with severely decreased LV systolic function; no pericardial effusion.  - Diuresis as needed. Strict I&Os.  - Likely contributing to resp distress.  - Repeat formal echo in the am. - pro-BNP = 05126q; bedside echo with severely decreased LV systolic function; no pericardial effusion.  - Pending TTE  - Diuresis as needed. Strict I&Os.  - Cardiology consulted, appreciate recs - pro-BNP = 98903q; bedside echo with severely decreased LV systolic function; no pericardial effusion.  - Pending TTE  - Strict I&Os.  - Cardiology consulted, appreciate recs

## 2024-03-25 NOTE — PROGRESS NOTE ADULT - PROBLEM SELECTOR PLAN 3
Medical Necessity Clause: This procedure was medically necessary because the lesions that were treated were: - S/p calcium gluconate, lasix, regular insulin, kayxalate.  - Continue to trend BMP in the am.  - Strict I&Os for UOP.  - Nephrology on consult; no need for emergent dialysis. Show Spray Paint Technique Variable?: Yes Spray Paint Technique: No Post-Care Instructions: I reviewed with the patient in detail post-care instructions. Patient is to wear sunprotection, and avoid picking at any of the treated lesions. Pt may apply Vaseline to crusted or scabbing areas. Patient verbalizes understanding. Written instructions provided. Spray Paint Text: The liquid nitrogen was applied to the skin utilizing a spray paint frosting technique. Medical Necessity Information: It is in your best interest to select a reason for this procedure from the list below. All of these items fulfill various CMS LCD requirements except the new and changing color options. Number Of Freeze-Thaw Cycles: 1 freeze-thaw cycle Detail Level: Simple Consent: The patient's consent was obtained including but not limited to risks of crusting, scabbing, blistering, scarring, darker or lighter pigmentary change, recurrence, incomplete removal and infection. - S/p calcium gluconate, lasix, regular insulin, kayexalate  - Continue to trend BMP  - Strict I&Os for UOP.  - Nephrology consulted, appreciate recs Patient found with hyperkalemia (K = 6.2) in setting of SAM s/p calcium gluconate, lasix, regular insulin, kayexalate K now 5.2  - Continue to trend BMP  - Strict I&Os for UOP.  - Nephrology consulted, appreciate recs

## 2024-09-11 NOTE — ED ADULT NURSE NOTE - PRO INTERPRETER NEED 2
Ceci
Treatment Goal Explanation (Does Not Render In The Note): Stable for the purposes of categorizing medical decision making is defined by the specific treatment goals for an individual patient. A patient that is not at their treatment goal is not stable, even if the condition has not changed and there is no short- term threat to life or function.

## 2024-10-08 NOTE — CONSULT NOTE ADULT - SUBJECTIVE AND OBJECTIVE BOX
Problem: ABCDS Injury Assessment  Goal: Absence of physical injury  Outcome: Progressing     Problem: Safety - Adult  Goal: Free from fall injury  Outcome: Progressing     Problem: Discharge Planning  Goal: Discharge to home or other facility with appropriate resources  Outcome: Progressing  Flowsheets (Taken 10/7/2024 1615 by Wilms, Tara M, RN)  Discharge to home or other facility with appropriate resources: Identify barriers to discharge with patient and caregiver      [facility] DIVISION OF KIDNEY DISEASES AND HYPERTENSION -- INITIAL CONSULT NOTE  --------------------------------------------------------------------------------  HPI:  Patient is an 86 y/o woman with recently diagnosed Afib, denies any history of CKD who presents with a chief complaint of acute onset shortness of breath.  Patient seen overnight yesterday to evaluate for adequate urine output after given lasix in the setting of SAM and hyperkalemia.  According to family at bedside who translated for patient and provided history independently, patient was feeling well until earlier on the day of admission when she began feeling short of breath at rest.  On review of medical documentation, patient provided alternative history that she has been experiencing exertional dyspnea for the past several days.  Patient experienced significant nausea with emesis, had difficulty lying down due to shortness of breath and was overall feeling unwell and came to the hospital.    In the ED, patient was found with elevated creatinine and hyperkalemia.  According to family, patient was seen in [place] by her PMD and denies any history of renal insufficiency.          PAST HISTORY  --------------------------------------------------------------------------------  PAST MEDICAL & SURGICAL HISTORY:  Asthma  Diabetes  Hypothyroid  HTN (hypertension)  History of renal stent  H/O abdominal hysterectomy    FAMILY HISTORY:  denies any first degree family members with renal failure    PAST SOCIAL HISTORY:  Denies any toxic habits    ALLERGIES & MEDICATIONS  --------------------------------------------------------------------------------  Allergies    Iron 100 (Rash)    Intolerances      Standing Inpatient Medications  ALBUTerol/ipratropium for Nebulization 3 milliLiter(s) Nebulizer every 6 hours  amLODIPine   Tablet 10 milliGRAM(s) Oral daily  buDESOnide 160 MICROgram(s)/formoterol 4.5 MICROgram(s) Inhaler 2 Puff(s) Inhalation two times a day  dextrose 5%. 1000 milliLiter(s) IV Continuous <Continuous>  dextrose 50% Injectable 12.5 Gram(s) IV Push once  dextrose 50% Injectable 25 Gram(s) IV Push once  dextrose 50% Injectable 25 Gram(s) IV Push once  furosemide   Injectable 40 milliGRAM(s) IV Push two times a day  insulin lispro (HumaLOG) corrective regimen sliding scale   SubCutaneous three times a day before meals  insulin lispro (HumaLOG) corrective regimen sliding scale   SubCutaneous at bedtime  levothyroxine 75 MICROGram(s) Oral daily  rivaroxaban 15 milliGRAM(s) Oral every 24 hours    PRN Inpatient Medications  dextrose Gel 1 Dose(s) Oral once PRN  glucagon  Injectable 1 milliGRAM(s) IntraMuscular once PRN      REVIEW OF SYSTEMS  --------------------------------------------------------------------------------  Gen: No fevers/chills, +weakness, +fatigue  Skin: No rashes  Head/Eyes/Ears/Mouth: No headache, no sore throat  Respiratory: +dyspnea, +cough  CV: No chest pain, +orthopnea  GI: No abdominal pain, diarrhea, constipation, +nausea, +vomiting  : No increased frequency, dysuria  MSK: No joint pain/swelling; no edema  Neuro: No dizziness/lightheadedness  Heme: No easy bruising or bleeding  Endo: No heat/cold intolerance      VITALS/PHYSICAL EXAM  --------------------------------------------------------------------------------  T(C): 35.9 (11-01-17 @ 04:23), Max: 37.2 (10-31-17 @ 20:35)  HR: 61 (11-01-17 @ 05:11) (53 - 61)  BP: 155/77 (11-01-17 @ 05:11) (155/77 - 223/81)  RR: 19 (11-01-17 @ 05:11) (16 - 24)  SpO2: 100% (11-01-17 @ 05:11) (98% - 100%)  Wt(kg): --        10-31-17 @ 07:01  -  11-01-17 @ 07:00  --------------------------------------------------------  IN: 0 mL / OUT: 2000 mL / NET: -2000 mL    11-01-17 @ 07:01  -  11-01-17 @ 07:54  --------------------------------------------------------  IN: 0 mL / OUT: 900 mL / NET: -900 mL      Physical Exam:  	Gen: NAD, on bipap  	HEENT: MMM  	Pulm: faint rales bibasilar, no rhonchi, no wheeze  	CV: RRR, S1S2; no rub  	Abd: +BS, soft  	: No suprapubic tenderness, + martin catheter in place  	UE: Warm, FROM, intact strength  	LE: Warm, FROM, intact strength; mild nonpitting edema  	Neuro: No focal deficits  	Psych: Normal affect and mood  	Skin: Warm, without rashes    LABS/STUDIES  --------------------------------------------------------------------------------              11.5   9.7   >-----------<  311      [10-31-17 @ 20:34]              35.1     132  |  96  |  36  ----------------------------<  259      [11-01-17 @ 02:21]  5.2   |  22  |  2.47        Ca     9.8     [11-01-17 @ 02:21]      Mg     1.6     [11-01-17 @ 02:21]      Phos  4.1     [11-01-17 @ 02:21]    TPro  8.1  /  Alb  3.9  /  TBili  0.2  /  DBili  x   /  AST  31  /  ALT  20  /  AlkPhos  80  [10-31-17 @ 20:34]    PT/INR: PT 27.0 , INR 2.45       [10-31-17 @ 20:34]  PTT: 41.8       [10-31-17 @ 20:34]    Troponin 0.10      [11-01-17 @ 02:21]        [10-31-17 @ 20:34]  Serum Osmolality 292      [10-31-17 @ 23:30]    Creatinine Trend:  SCr 2.47 [11-01 @ 02:21]  SCr 2.50 [10-31 @ 20:34]    Urinalysis - [10-31-17 @ 21:14]      Color Yellow / Appearance Clear / SG 1.013 / pH 6.0      Gluc Negative / Ketone Negative  / Bili Negative / Urobili Negative       Blood Small / Protein 150 / Leuk Est Negative / Nitrite Negative      RBC 10-25 / WBC 3-5 / Hyaline  / Gran  / Sq Epi  / Non Sq Epi Few / Bacteria     Urine Creatinine 15      [10-31-17 @ 23:35]  Urine Sodium 86      [10-31-17 @ 23:35]  Urine Osmolality 285      [10-31-17 @ 23:35]

## 2025-03-22 NOTE — DISCHARGE NOTE ADULT - NS AS DC FOLLOWUP INST YN
[employer] [facility]    Questions     Question 1   SDOH Concerns   Do you have any concerns affording medication or food,? Press 1 if yes or press 2 if no.   Has SDOH Questions          Call Status:     Attempt 1  ), Answered     SDOH - Issues         SDOH - Issues List:     Financial Concerns       Comments:     Patient is having issues affording medications .      SDOH - Actions Taken         Comments:     Writer   discussed [facility] and buying folic acid, vitamin d 2, and iron over the counter at a discounted rate from [employer].      Yes